# Patient Record
Sex: MALE | Race: OTHER | Employment: UNEMPLOYED | URBAN - METROPOLITAN AREA
[De-identification: names, ages, dates, MRNs, and addresses within clinical notes are randomized per-mention and may not be internally consistent; named-entity substitution may affect disease eponyms.]

---

## 2018-01-11 PROBLEM — E11.40 TYPE 2 DIABETES MELLITUS WITH DIABETIC NEUROPATHY (HCC): Status: ACTIVE | Noted: 2018-01-11

## 2019-09-25 ENCOUNTER — HOSPITAL ENCOUNTER (OUTPATIENT)
Dept: CT IMAGING | Age: 64
Discharge: HOME | End: 2019-09-25
Attending: INTERNAL MEDICINE
Payer: MEDICARE

## 2019-09-25 VITALS — HEIGHT: 67 IN | WEIGHT: 199 LBS | BODY MASS INDEX: 31.23 KG/M2

## 2019-09-25 DIAGNOSIS — Z86.73 HISTORY OF RECURRENT TIAS: ICD-10-CM

## 2019-09-25 LAB — CREAT BLD-MCNC: 1.3 MG/DL (ref 0.8–1.5)

## 2019-09-25 PROCEDURE — 74011000258 HC RX REV CODE- 258: Performed by: INTERNAL MEDICINE

## 2019-09-25 PROCEDURE — 82565 ASSAY OF CREATININE: CPT

## 2019-09-25 PROCEDURE — 74011636320 HC RX REV CODE- 636/320: Performed by: INTERNAL MEDICINE

## 2019-09-25 PROCEDURE — 70498 CT ANGIOGRAPHY NECK: CPT

## 2019-09-25 RX ORDER — SODIUM CHLORIDE 0.9 % (FLUSH) 0.9 %
10 SYRINGE (ML) INJECTION
Status: COMPLETED | OUTPATIENT
Start: 2019-09-25 | End: 2019-09-25

## 2019-09-25 RX ADMIN — Medication 10 ML: at 15:47

## 2019-09-25 RX ADMIN — IOPAMIDOL 60 ML: 755 INJECTION, SOLUTION INTRAVENOUS at 15:47

## 2019-09-25 RX ADMIN — SODIUM CHLORIDE 100 ML: 900 INJECTION, SOLUTION INTRAVENOUS at 15:47

## 2019-10-15 PROBLEM — I67.9 CEREBROVASCULAR DISEASE: Status: ACTIVE | Noted: 2019-10-15

## 2022-03-19 PROBLEM — E11.40 TYPE 2 DIABETES MELLITUS WITH DIABETIC NEUROPATHY (HCC): Status: ACTIVE | Noted: 2018-01-11

## 2022-03-19 PROBLEM — I67.9 CEREBROVASCULAR DISEASE: Status: ACTIVE | Noted: 2019-10-15

## 2022-07-21 ENCOUNTER — OFFICE VISIT (OUTPATIENT)
Dept: INTERNAL MEDICINE CLINIC | Facility: CLINIC | Age: 67
End: 2022-07-21
Payer: COMMERCIAL

## 2022-07-21 VITALS
HEART RATE: 62 BPM | SYSTOLIC BLOOD PRESSURE: 116 MMHG | OXYGEN SATURATION: 98 % | HEIGHT: 65 IN | DIASTOLIC BLOOD PRESSURE: 57 MMHG | BODY MASS INDEX: 31.65 KG/M2 | WEIGHT: 190 LBS | RESPIRATION RATE: 16 BRPM | TEMPERATURE: 98.4 F

## 2022-07-21 DIAGNOSIS — Z86.73 HISTORY OF CVA (CEREBROVASCULAR ACCIDENT): ICD-10-CM

## 2022-07-21 DIAGNOSIS — E11.65 UNCONTROLLED TYPE 2 DIABETES MELLITUS WITH HYPERGLYCEMIA (HCC): Primary | ICD-10-CM

## 2022-07-21 DIAGNOSIS — N18.32 STAGE 3B CHRONIC KIDNEY DISEASE (HCC): ICD-10-CM

## 2022-07-21 DIAGNOSIS — I25.10 CORONARY ARTERY DISEASE INVOLVING NATIVE HEART WITHOUT ANGINA PECTORIS, UNSPECIFIED VESSEL OR LESION TYPE: ICD-10-CM

## 2022-07-21 DIAGNOSIS — Z76.89 ENCOUNTER TO ESTABLISH CARE: ICD-10-CM

## 2022-07-21 DIAGNOSIS — I10 HYPERTENSION, UNSPECIFIED TYPE: ICD-10-CM

## 2022-07-21 DIAGNOSIS — I50.31 ACUTE HEART FAILURE WITH PRESERVED EJECTION FRACTION (HCC): ICD-10-CM

## 2022-07-21 DIAGNOSIS — E78.2 MIXED HYPERLIPIDEMIA: ICD-10-CM

## 2022-07-21 PROBLEM — D50.9 IRON DEFICIENCY ANEMIA: Status: ACTIVE | Noted: 2022-07-21

## 2022-07-21 PROCEDURE — 1123F ACP DISCUSS/DSCN MKR DOCD: CPT | Performed by: NURSE PRACTITIONER

## 2022-07-21 PROCEDURE — 99204 OFFICE O/P NEW MOD 45 MIN: CPT | Performed by: NURSE PRACTITIONER

## 2022-07-21 RX ORDER — INSULIN ASPART 100 [IU]/ML
4 INJECTION, SOLUTION INTRAVENOUS; SUBCUTANEOUS 2 TIMES DAILY
Qty: 9 ML | Refills: 5 | Status: SHIPPED | OUTPATIENT
Start: 2022-07-21 | End: 2022-08-18 | Stop reason: ALTCHOICE

## 2022-07-21 RX ORDER — LEVOTHYROXINE SODIUM 0.2 MG/1
TABLET ORAL
COMMUNITY
Start: 2022-06-21 | End: 2022-08-05 | Stop reason: SDUPTHER

## 2022-07-21 RX ORDER — ASPIRIN 81 MG/1
81 TABLET, CHEWABLE ORAL DAILY
COMMUNITY
Start: 2022-06-21

## 2022-07-21 RX ORDER — ATORVASTATIN CALCIUM 80 MG/1
80 TABLET, FILM COATED ORAL DAILY
COMMUNITY
Start: 2022-06-21 | End: 2022-07-21 | Stop reason: SDUPTHER

## 2022-07-21 RX ORDER — FUROSEMIDE 40 MG/1
40 TABLET ORAL DAILY
Qty: 90 TABLET | Refills: 1 | Status: CANCELLED | OUTPATIENT
Start: 2022-07-21 | End: 2022-08-20

## 2022-07-21 RX ORDER — GLIPIZIDE 10 MG/1
TABLET ORAL
COMMUNITY
Start: 2022-06-21 | End: 2022-07-21 | Stop reason: SDUPTHER

## 2022-07-21 RX ORDER — LEVOTHYROXINE SODIUM 0.2 MG/1
200 TABLET ORAL DAILY
Qty: 90 TABLET | Refills: 1 | Status: CANCELLED | OUTPATIENT
Start: 2022-07-21

## 2022-07-21 RX ORDER — LISINOPRIL 40 MG/1
40 TABLET ORAL DAILY
COMMUNITY
Start: 2022-06-21 | End: 2022-07-21 | Stop reason: SDUPTHER

## 2022-07-21 RX ORDER — LISINOPRIL 40 MG/1
40 TABLET ORAL DAILY
Qty: 90 TABLET | Refills: 0 | Status: SHIPPED | OUTPATIENT
Start: 2022-07-21 | End: 2022-10-20 | Stop reason: SDUPTHER

## 2022-07-21 RX ORDER — FERROUS SULFATE 325(65) MG
325 TABLET ORAL DAILY
Qty: 90 TABLET | Refills: 1 | Status: CANCELLED | OUTPATIENT
Start: 2022-07-21

## 2022-07-21 RX ORDER — LANCETS 23 GAUGE
EACH MISCELLANEOUS
COMMUNITY
Start: 2022-06-21

## 2022-07-21 RX ORDER — ATORVASTATIN CALCIUM 80 MG/1
80 TABLET, FILM COATED ORAL DAILY
Qty: 90 TABLET | Refills: 0 | Status: SHIPPED | OUTPATIENT
Start: 2022-07-21 | End: 2022-10-20 | Stop reason: SDUPTHER

## 2022-07-21 RX ORDER — FERROUS SULFATE 325(65) MG
325 TABLET ORAL DAILY
COMMUNITY
Start: 2022-06-21

## 2022-07-21 RX ORDER — LISINOPRIL 40 MG/1
40 TABLET ORAL DAILY
Qty: 90 TABLET | Refills: 1 | Status: CANCELLED | OUTPATIENT
Start: 2022-07-21

## 2022-07-21 RX ORDER — PEN NEEDLE, DIABETIC 30 GX3/16"
NEEDLE, DISPOSABLE MISCELLANEOUS
Qty: 100 EACH | Refills: 5 | Status: SHIPPED | OUTPATIENT
Start: 2022-07-21 | End: 2022-08-04 | Stop reason: SDUPTHER

## 2022-07-21 RX ORDER — PEN NEEDLE, DIABETIC 30 GX3/16"
NEEDLE, DISPOSABLE MISCELLANEOUS 2 TIMES DAILY
COMMUNITY
End: 2022-07-21 | Stop reason: SDUPTHER

## 2022-07-21 RX ORDER — GLIPIZIDE 10 MG/1
10 TABLET ORAL
Qty: 180 TABLET | Refills: 0 | Status: SHIPPED | OUTPATIENT
Start: 2022-07-21 | End: 2022-10-20 | Stop reason: ALTCHOICE

## 2022-07-21 RX ORDER — INSULIN ASPART 100 [IU]/ML
4 INJECTION, SOLUTION INTRAVENOUS; SUBCUTANEOUS 2 TIMES DAILY
COMMUNITY
End: 2022-07-21 | Stop reason: SDUPTHER

## 2022-07-21 RX ORDER — FUROSEMIDE 40 MG/1
40 TABLET ORAL DAILY
COMMUNITY
Start: 2022-06-22 | End: 2022-07-22

## 2022-07-21 RX ORDER — CARVEDILOL 25 MG/1
25 TABLET ORAL 2 TIMES DAILY
Qty: 180 TABLET | Refills: 1 | Status: CANCELLED | OUTPATIENT
Start: 2022-07-21

## 2022-07-21 RX ORDER — ATORVASTATIN CALCIUM 80 MG/1
80 TABLET, FILM COATED ORAL DAILY
Qty: 90 TABLET | Refills: 1 | Status: CANCELLED | OUTPATIENT
Start: 2022-07-21

## 2022-07-21 RX ORDER — CLOPIDOGREL BISULFATE 75 MG/1
TABLET ORAL
COMMUNITY
Start: 2022-06-21

## 2022-07-21 RX ORDER — CLOPIDOGREL BISULFATE 75 MG/1
75 TABLET ORAL DAILY
Qty: 90 TABLET | Refills: 1 | Status: CANCELLED | OUTPATIENT
Start: 2022-07-21

## 2022-07-21 RX ORDER — CARVEDILOL 25 MG/1
25 TABLET ORAL 2 TIMES DAILY
COMMUNITY
Start: 2022-06-21

## 2022-07-21 ASSESSMENT — ENCOUNTER SYMPTOMS
EYE PAIN: 0
RHINORRHEA: 0
SHORTNESS OF BREATH: 0
ABDOMINAL PAIN: 0
SINUS PAIN: 0
VOMITING: 0
NAUSEA: 0
DIARRHEA: 0
COUGH: 0
BACK PAIN: 0
CONSTIPATION: 0
SORE THROAT: 0

## 2022-07-21 ASSESSMENT — PATIENT HEALTH QUESTIONNAIRE - PHQ9
1. LITTLE INTEREST OR PLEASURE IN DOING THINGS: 0
SUM OF ALL RESPONSES TO PHQ QUESTIONS 1-9: 0
SUM OF ALL RESPONSES TO PHQ9 QUESTIONS 1 & 2: 0
SUM OF ALL RESPONSES TO PHQ QUESTIONS 1-9: 0
2. FEELING DOWN, DEPRESSED OR HOPELESS: 0

## 2022-07-21 NOTE — PROGRESS NOTES
[facility]  [address] [facility]  Tel# [phone]  Fax# [phone]       Necleveland SterlingReys, [facility]  Family Nurse Practitioner            Date of Visit: 2022     Elisha Craft (: 1955) is a 75 y.o. male  new patient, here for evaluation of the following chief complaint(s):    Chief Complaint   Patient presents with    Establish Care         Patient Care Team:  KULDEEP Alberts - CNP as PCP - General (Nurse Practitioner)  Shweta Newton MD as Surgeon         History of Present Illness      New Patient  Presents here as a new patient and wants to establish care. Previous PCP Dr. Marky Hsu MD of [address] [place] [address] Internal Medicine,  [address] with him was on 2022. DM Type II  Chronic condition; thinks he was diagnosed 6 years ago either here in [address] [place] [address]. or when he was at [facility]  Blood sugar monitoring: \"varies\" 200-459  Diet: B - coffee, bagel, cream cheese, banana           L/D: chicken or snack on a plum           States he only eats twice a day. Physical activity: \"I can't do much\", states he can't walk to the mailbox, states \"I have no energy\". Complications: CKD, neuropathy  Medications: Novolog insulin takes 4 units SQ twice a day; states he was given a sample. Hypertension  Chronic condition  BP monitoring: not monitoring      Fatigue  Pt states he feels tired \"all day\". Pt with complaint of not sleeping well for \"years\". States he felt he lost his energy after his last stroke.        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 Another daughter, has not spoken to her for over 20 years. Denies any spouse. States he likes to read, around 1-3:30p and at 1-2 am; states he has over 700 books (Abbey RuffaloCODY)  States he does not have an appetites, he feels his food is \"sufficient\", eating twice a day. States he used to weigh 265 lbs, 3 years ago, didn't like the way he looked, started using a treadmill for a year, lost 75 lbs. Has stopped using treadmill since the weight loss. Insomnia  States when he tries to sleep, he can't fall asleep, states when he takes a nap, he gets about 6 hours of sleep at night. States \"I won't take a pill to sleep, I don't believe in that\"          Lab Results   Component Value Date     12/05/2019    K 4.7 12/05/2019     12/05/2019    CO2 22 12/05/2019    BUN 30 (H) 12/05/2019    CREATININE 1.18 12/05/2019    GLUCOSE 99 12/05/2019    CALCIUM 9.7 12/05/2019    PROT 6.9 12/05/2019    LABALBU 3.9 12/05/2019    BILITOT 0.3 12/05/2019    ALKPHOS 157 (H) 12/05/2019    AST 6 12/05/2019    ALT 8 12/05/2019    LABGLOM 59 (L) 09/25/2019    GFRAA 74 12/05/2019    AGRATIO 1.3 12/05/2019          Lab Results   Component Value Date    CHOL 129 12/05/2019    CHOL 283 (H) 08/22/2019     Lab Results   Component Value Date    TRIG 117 12/05/2019    TRIG 236 (H) 08/22/2019     Lab Results   Component Value Date    HDL 31 (L) 12/05/2019    HDL 28 (L) 08/22/2019     Lab Results   Component Value Date    LDLCALC 75 12/05/2019    LDLCALC 208 (H) 08/22/2019     Lab Results   Component Value Date    LABVLDL 23 12/05/2019    LABVLDL 47 (H) 08/22/2019     No results found for: CHOLHDLRATIO     Lab Results   Component Value Date    TSH 4.740 (H) 12/05/2019        No results found for: PSA, PSADIA     No results found for: LABA1C  No results found for: EAG     [facility]  ?06/19/2022 - DIABETES ([facility])?   Component 06/19/2022 12/08/2020        Hemoglobin A1C 13.6 High     7.3 High Mean Bld Glu Estim. 344 --     Component      Hemoglobin A1C   Mean Bld Glu Estim. 06/11/2021 06/11/2021 04/13/2021 02/02/2021 02/01/2021 01/31/2021 01/30/2021 01/29/2021 01/28/2021 12/08/2020 12/08/2020                         Microalbumin Conc, Urine -- -- -- -- -- -- -- -- -- -- -- -- -- 150 Abnormal     --   Creatinine Conc, Urine -- -- -- -- -- -- -- -- -- -- -- -- -- 200 --   Microalb/Creat Ratio -- -- -- -- -- -- -- -- -- -- -- -- --  mg/g (abnormal) Abnormal     --   Creatinine 2.01 High     2.18 High     1.89 High     2.27 High     1.82 High     1.92 High     2.05 High     1.72 High     1.72 High     1.67 High     1.64 High     1.71 High     1.77 High     -- 1.27 High         Component      Microalbumin Conc, Urine   Creatinine Conc, Urine   Microalb/Creat Ratio   Creatinine     6/21/2022 - CHEM TESTS ([facility])?   Component 06/21/2022 06/21/2022 06/20/2022 06/20/2022 06/19/2022 06/19/2022 06/18/2022 06/11/2021 06/11/2021 04/13/2021 02/02/2021 02/01/2021 01/31/2021 01/31/2021 01/31/2021 01/30/2021 01/30/2021 01/29/2021 01/29/2021 01/28/2021 01/28/2021 01/28/2021 01/28/2021 01/28/2021 01/28/2021 12/08/2020                                Sodium 133 Low     -- 132 Low     -- 135 Low     -- 131 Low     135 Low     132 Low     133 Low     139 137 138 -- -- 138 -- 137 -- -- -- -- 140 -- -- 137   Potassium 3.5 -- 3.6 -- 3.6 -- 4.3 4.9 7.1 High Panic      4.9 3.8 3.5 3.8 -- -- 4.1 -- 4.1 -- -- -- -- 3.5 -- -- 4.2   Chloride 99 -- 96 Low     -- 101 -- 100 103 103 103 104 102 103 -- -- 105 -- 106 -- -- -- -- 105 -- -- 105   CO2 24 -- 25 -- 22 -- 19 Low     21 20 19 Low     23 23 24 -- -- 25 -- 23 -- -- -- -- 19 Low     -- -- 22   Anion Gap 10 -- 11 -- 12 -- 12 11 9 11 13 12 11 -- -- 8 -- 8 -- -- -- -- 16 -- -- 10   BUN 37 High     -- 31 High     -- 25 -- 30 High     30 High     31 High     34 High     41 High     38 High     31 High     -- -- 26 -- 26 -- -- -- -- 24 -- -- 26   Creatinine 2.01 High     -- 2.18 High     -- 1.89 High     -- 2.27 High     1.82 High     1.92 High     2.05 High     1.72 High     1.72 High     1.67 High     -- -- 1.64 High     -- 1.71 High     -- -- -- -- 1.77 High     -- -- 1.27 High       Glucose 237 High     -- 184 High     -- 86 -- 597 High Panic     328 High     367 High     398 High     162 High     212 High     90 -- -- 168 High     -- 170 High     -- -- -- -- 305 High     -- -- 159 High       Calcium 8.8 -- 8.9 -- 9.3 -- 8.8 9.7 9.6 9.6 8.8 8.8 9.2 -- -- 8.6 -- 8.5 -- -- -- -- 9.2 -- -- 9.7   AST -- -- -- -- -- 27 -- 15 41 High      15 -- -- -- -- -- 20 -- -- -- -- -- -- 18 -- -- 12   ALT -- -- -- -- -- 26 -- 25 26 22 -- -- -- -- -- 18 -- -- -- -- -- -- 24 -- -- 10   Alkaline Phosphatase -- -- -- -- -- 107 -- 151 High     145 149 -- -- -- -- -- 116 -- -- -- -- -- -- 165 High     -- -- 153 High       Protein, Total -- -- -- -- -- 6.8 -- 7.5 8.1 7.4 -- -- -- -- -- 5.9 Low     -- -- -- -- -- -- 7.9 -- -- 7.3   Albumin -- -- -- -- -- 3.0 Low     -- 3.2 Low     3.1 Low     3.2 Low     -- -- -- -- -- 2.9 Low     -- -- -- -- -- -- 3.4 -- -- 3.1 Low       Bilirubin, Total -- -- -- -- -- 0.3 -- 0.3 0.3 0.3 -- -- -- -- -- 0.4 -- -- -- -- -- -- 0.2 -- -- 0.3   eGFR 35 Low      -- 32 Low      -- 38 Low      -- 31 Low      38 Low     35 Low     33 Low     41 Low     41 Low     42 Low     -- -- 43 Low     -- 41 Low     -- -- -- -- 39 Low     -- 48 Low     58 Low       eGFR  -- -- -- -- -- -- -- 43 Low     41 Low     38 Low     47 Low     47 Low     49 Low     -- -- 50 Low     -- 47 Low     -- -- -- -- 45 Low     -- 55 Low     68   Calcium, Ionized -- -- -- -- -- -- -- -- -- -- -- -- -- -- -- -- -- -- -- -- -- -- -- -- 1.20 --   Magnesium -- 2.4 -- 2.2 -- -- -- -- -- -- -- -- -- 2.1 -- -- 1.9 -- 1.8 -- 2.1 -- -- -- -- --   Lactate -- -- -- -- -- -- -- -- -- -- -- -- -- -- -- -- -- -- -- 1.5 -- 5.2 High Panic     -- -- -- --   Procalcitonin -- -- -- -- -- -- -- -- -- -- -- -- -- -- 0.19 High     -- -- -- -- -- -- -- -- 0.05 -- --   Bilirubin, Direct -- -- -- -- -- 0.1 -- -- -- -- -- -- -- -- -- -- -- -- -- -- -- -- -- -- -- --   Bilirubin, Indirect -- -- -- -- -- 0.2 -- -- -- -- -- -- -- -- -- -- -- -- -- -- -- -- -- -- -- --     Component      Sodium   Potassium   Chloride   CO2   Anion Gap   BUN   Creatinine   Glucose   Calcium   AST   ALT   Alkaline Phosphatase   Protein, Total   Albumin   Bilirubin, Total   eGFR   eGFR African American   Calcium, Ionized   Magnesium             Patient Active Problem List   Diagnosis    Hypothyroidism    Peripheral neuropathy    Type 2 diabetes mellitus with diabetic neuropathy (HCC)    Cerebrovascular disease    Diabetes mellitus type II, uncontrolled (HCC)    Tobacco abuse    HTN (hypertension)    Stage 3b chronic kidney disease (HCC)    Mixed hyperlipidemia    Coronary artery disease involving native heart without angina pectoris    Acute heart failure with preserved ejection fraction (HCC)    Iron deficiency anemia       Past Medical History:   Diagnosis Date    Diabetes ([facility] [address]     Diabetes mellitus type II, uncontrolled ([facility] [address] 2/28/2014    GERD (gastroesophageal reflux disease)     HTN (hypertension) 2/28/2014    Hypothyroidism 2/28/2014    Peripheral neuropathy 2/28/2014    Psychiatric disorder     Thyroid disease     Tobacco abuse 2/28/2014       Past Surgical History:   Procedure Laterality Date    COLONOSCOPY  02/06/2012    due date 02/06/2015       Family History   Problem Relation Age of Onset    Alcohol Abuse Father          ALLERGY  Allergies   Allergen Reactions    Food Hives and Swelling     Onions and peppers           Current Outpatient Medications on File Prior to Visit   Medication Sig Dispense Refill    aspirin 81 MG chewable tablet Take 81 mg by mouth in the morning. furosemide (LASIX) 40 MG tablet Take 40 mg by mouth in the morning.       clopidogrel (PLAVIX) 75 MG tablet       ferrous sulfate (IRON 325) 325 (65 Fe) MG tablet Take 325 mg by mouth in the morning. levothyroxine (SYNTHROID) 200 MCG tablet       vitamin D (CHOLECALCIFEROL) 25 MCG (1000 UT) TABS tablet Take 1,000 Units by mouth in the morning. carvedilol (COREG) 25 MG tablet Take 25 mg by mouth in the morning and 25 mg before bedtime. Lancets 28G MISC Patient/Insurance Preference Use as directed 3 times a day. No current facility-administered medications on file prior to visit. Review of Systems  Review of Systems   Constitutional:  Positive for fatigue. Negative for chills and fever. HENT:  Negative for congestion, postnasal drip, rhinorrhea, sinus pain, sneezing and sore throat. Eyes:  Negative for pain and visual disturbance. Respiratory:  Negative for cough and shortness of breath. Cardiovascular:  Negative for chest pain, palpitations and leg swelling. Gastrointestinal:  Negative for abdominal pain, constipation, diarrhea, nausea and vomiting. Genitourinary:  Negative for dysuria, frequency and urgency. Musculoskeletal:  Negative for back pain, gait problem and joint swelling. Skin:  Negative for rash and wound. Neurological:  Positive for numbness (tingling to feet). Negative for dizziness and headaches. Psychiatric/Behavioral:  Positive for sleep disturbance. Negative for behavioral problems and suicidal ideas. The patient is not nervous/anxious. Vitals:    07/21/22 1018   BP: (!) 116/57   Site: Left Upper Arm   Position: Sitting   Cuff Size: Large Adult   Pulse: 62   Resp: 16   Temp: 98.4 °F (36.9 °C)   TempSrc: Temporal   SpO2: 98%   Weight: 190 lb (86.2 kg)   Height: 5' 4.96\" (1.65 m)              Physical Exam  Physical Exam  Constitutional:       General: He is not in acute distress. Appearance: He is not ill-appearing. HENT:      Head: Normocephalic and atraumatic. Eyes:      Pupils: Pupils are equal, round, and reactive to light.    Cardiovascular:      Rate and Rhythm: Normal rate and regular rhythm. Pulmonary:      Effort: Pulmonary effort is normal. No respiratory distress. Breath sounds: Normal breath sounds. Abdominal:      General: Bowel sounds are normal.      Palpations: Abdomen is soft. Musculoskeletal:         General: Normal range of motion. Cervical back: Neck supple. Right lower leg: Edema present. Left lower leg: Edema present. Skin:     General: Skin is warm and dry. Neurological:      General: No focal deficit present. Mental Status: He is alert and oriented to person, place, and time. Psychiatric:         Mood and Affect: Mood normal.         Thought Content: Thought content normal.              Assessment/Plan:          ICD-10-CM    1. Uncontrolled type 2 diabetes mellitus with hyperglycemia (HCC)  E11.65 Insulin Pen Needle (PEN NEEDLES) 32G X 4 MM MISC     insulin aspart (NOVOLOG) 100 UNIT/ML injection vial     [facility]     glipiZIDE (GLUCOTROL) 10 MG tablet      2. Encounter to establish care  Z76.89       3. Acute heart failure with preserved ejection fraction (HCC)  I50.31     Continue Carvedilol 25 mg oral twice a day. 4. Hypertension, unspecified type  I10 lisinopril (PRINIVIL;ZESTRIL) 40 MG tablet      5. Coronary artery disease involving native heart without angina pectoris, unspecified vessel or lesion type  I25.10     Continue Plavix. 6. Mixed hyperlipidemia  E78.2 atorvastatin (LIPITOR) 80 MG tablet      7. Stage 3b chronic kidney disease (HCC)  N18.32     Avoid NSAIDs such as ibuprofen. Ensure better blood sugar control. 8. History of CVA (cerebrovascular accident)  Z86.73     Cont Plavis 75 mg oral daily. Brittany Salguero was seen today for establish care.     Diagnoses and all orders for this visit:    Uncontrolled type 2 diabetes mellitus with hyperglycemia (HCC)  -     Insulin Pen Needle (PEN NEEDLES) 32G X 4 MM MISC; Use twice a day and as needed  -     insulin aspart (NOVOLOG) 100 UNIT/ML injection vial; Inject 4 Units into the skin in the morning and 4 Units before bedtime.  -     [facility] Di[facility] Treatment  -     glipiZIDE (GLUCOTROL) 10 MG tablet; Take 1 tablet by mouth in the morning and 1 tablet in the evening. Take before meals. Encounter to establish care    Acute heart failure with preserved ejection fraction (HCC)  Comments:  Continue Carvedilol 25 mg oral twice a day. Hypertension, unspecified type  -     lisinopril (PRINIVIL;ZESTRIL) 40 MG tablet; Take 1 tablet by mouth in the morning. Coronary artery disease involving native heart without angina pectoris, unspecified vessel or lesion type  Comments:  Continue Plavix. Mixed hyperlipidemia  -     atorvastatin (LIPITOR) 80 MG tablet; Take 1 tablet by mouth in the morning. Stage 3b chronic kidney disease ([address]  Comments:  Avoid NSAIDs such as ibuprofen. Ensure better blood sugar control. History of CVA (cerebrovascular accident)  Comments:  Cont Plavis 75 mg oral daily. Advise on low salt, low carbohydrate, low cholesterol, high fiber, nutrient dense, whole foods. Advise on portion control. Advise to avoid sweet foods, sweet drinks. Advise to read food labels. Advise on exercise or physical activity at least 30 minutes, 3-5 days a week, as tolerated. Advise on CV risks with diabetes mellitus and hyperlipidemia. Advised to monitor blood sugar, log blood sugar and log food intake and bring at next visit. Given medication list, reviewed med list from 6/24/2022 visit from Dr. Remington Dumont. Advised to  medication refills at the pharmacy.         Orders Placed This Encounter   Procedures    [facility]     Referral Priority:   Routine     Referral Type:   Eval and Treat     Referral Reason:   Specialty Services Required     Number of Visits Requested:   1                    Follow Up  Return in about 2 weeks (around 8/4/2022), or if symptoms worsen or fail to improve, for MARA (diabetes follow up).              Gerline Bank, DNP, FNP-BC

## 2022-07-22 ENCOUNTER — TELEPHONE (OUTPATIENT)
Dept: DIABETES SERVICES | Age: 67
End: 2022-07-22

## 2022-07-26 ENCOUNTER — FOLLOWUP TELEPHONE ENCOUNTER (OUTPATIENT)
Dept: DIABETES SERVICES | Age: 67
End: 2022-07-26

## 2022-07-26 NOTE — TELEPHONE ENCOUNTER
Called regarding diabetes education. Talked with daughter who stated yes check insurance and then gave us pt's mobile number.

## 2022-08-04 ENCOUNTER — OFFICE VISIT (OUTPATIENT)
Dept: INTERNAL MEDICINE CLINIC | Facility: CLINIC | Age: 67
End: 2022-08-04
Payer: COMMERCIAL

## 2022-08-04 VITALS
HEART RATE: 56 BPM | BODY MASS INDEX: 32.22 KG/M2 | HEIGHT: 65 IN | DIASTOLIC BLOOD PRESSURE: 60 MMHG | WEIGHT: 193.4 LBS | OXYGEN SATURATION: 99 % | SYSTOLIC BLOOD PRESSURE: 113 MMHG | TEMPERATURE: 98.4 F

## 2022-08-04 DIAGNOSIS — Z12.5 SCREENING FOR PROSTATE CANCER: ICD-10-CM

## 2022-08-04 DIAGNOSIS — E78.2 MIXED HYPERLIPIDEMIA: ICD-10-CM

## 2022-08-04 DIAGNOSIS — E11.65 UNCONTROLLED TYPE 2 DIABETES MELLITUS WITH HYPERGLYCEMIA (HCC): ICD-10-CM

## 2022-08-04 DIAGNOSIS — N18.32 STAGE 3B CHRONIC KIDNEY DISEASE (HCC): ICD-10-CM

## 2022-08-04 DIAGNOSIS — E11.65 UNCONTROLLED TYPE 2 DIABETES MELLITUS WITH HYPERGLYCEMIA (HCC): Primary | ICD-10-CM

## 2022-08-04 PROBLEM — D50.9 IRON DEFICIENCY ANEMIA: Status: RESOLVED | Noted: 2022-07-21 | Resolved: 2022-08-04

## 2022-08-04 LAB
ALBUMIN SERPL-MCNC: 3.2 G/DL (ref 3.2–4.6)
ALBUMIN/GLOB SERPL: 0.8 {RATIO} (ref 1.2–3.5)
ALP SERPL-CCNC: 175 U/L (ref 50–136)
ALT SERPL-CCNC: 101 U/L (ref 12–65)
ANION GAP SERPL CALC-SCNC: 7 MMOL/L (ref 7–16)
AST SERPL-CCNC: 40 U/L (ref 15–37)
BILIRUB SERPL-MCNC: 0.3 MG/DL (ref 0.2–1.1)
BUN SERPL-MCNC: 27 MG/DL (ref 8–23)
CALCIUM SERPL-MCNC: 9.1 MG/DL (ref 8.3–10.4)
CHLORIDE SERPL-SCNC: 105 MMOL/L (ref 98–107)
CHOLEST SERPL-MCNC: 179 MG/DL
CO2 SERPL-SCNC: 24 MMOL/L (ref 21–32)
CREAT SERPL-MCNC: 1.9 MG/DL (ref 0.8–1.5)
GLOBULIN SER CALC-MCNC: 3.9 G/DL (ref 2.3–3.5)
GLUCOSE SERPL-MCNC: 245 MG/DL (ref 65–100)
HDLC SERPL-MCNC: 31 MG/DL (ref 40–60)
HDLC SERPL: 5.8 {RATIO}
LDLC SERPL CALC-MCNC: 95.8 MG/DL
POTASSIUM SERPL-SCNC: 4.3 MMOL/L (ref 3.5–5.1)
PROT SERPL-MCNC: 7.1 G/DL (ref 6.3–8.2)
SODIUM SERPL-SCNC: 136 MMOL/L (ref 138–145)
TRIGL SERPL-MCNC: 261 MG/DL (ref 35–150)
TSH, 3RD GENERATION: 83.8 UIU/ML (ref 0.36–3.74)
VLDLC SERPL CALC-MCNC: 52.2 MG/DL (ref 6–23)

## 2022-08-04 PROCEDURE — 1123F ACP DISCUSS/DSCN MKR DOCD: CPT | Performed by: NURSE PRACTITIONER

## 2022-08-04 PROCEDURE — 99214 OFFICE O/P EST MOD 30 MIN: CPT | Performed by: NURSE PRACTITIONER

## 2022-08-04 RX ORDER — FERROUS SULFATE 325(65) MG
325 TABLET ORAL DAILY
Qty: 30 TABLET | Status: CANCELLED | OUTPATIENT
Start: 2022-08-04

## 2022-08-04 RX ORDER — INSULIN DEGLUDEC INJECTION 100 U/ML
20 INJECTION, SOLUTION SUBCUTANEOUS DAILY
Qty: 15 ML | Refills: 5 | Status: SHIPPED | OUTPATIENT
Start: 2022-08-04 | End: 2022-10-24 | Stop reason: SDUPTHER

## 2022-08-04 RX ORDER — PEN NEEDLE, DIABETIC 30 GX3/16"
NEEDLE, DISPOSABLE MISCELLANEOUS
Qty: 100 EACH | Refills: 5 | Status: SHIPPED | OUTPATIENT
Start: 2022-08-04

## 2022-08-04 ASSESSMENT — ANXIETY QUESTIONNAIRES
1. FEELING NERVOUS, ANXIOUS, OR ON EDGE: 0
5. BEING SO RESTLESS THAT IT IS HARD TO SIT STILL: 0
6. BECOMING EASILY ANNOYED OR IRRITABLE: 0
3. WORRYING TOO MUCH ABOUT DIFFERENT THINGS: 0
7. FEELING AFRAID AS IF SOMETHING AWFUL MIGHT HAPPEN: 0
4. TROUBLE RELAXING: 0
IF YOU CHECKED OFF ANY PROBLEMS ON THIS QUESTIONNAIRE, HOW DIFFICULT HAVE THESE PROBLEMS MADE IT FOR YOU TO DO YOUR WORK, TAKE CARE OF THINGS AT HOME, OR GET ALONG WITH OTHER PEOPLE: NOT DIFFICULT AT ALL
GAD7 TOTAL SCORE: 0
2. NOT BEING ABLE TO STOP OR CONTROL WORRYING: 0

## 2022-08-04 ASSESSMENT — PATIENT HEALTH QUESTIONNAIRE - PHQ9
1. LITTLE INTEREST OR PLEASURE IN DOING THINGS: 0
SUM OF ALL RESPONSES TO PHQ QUESTIONS 1-9: 0
SUM OF ALL RESPONSES TO PHQ9 QUESTIONS 1 & 2: 0
2. FEELING DOWN, DEPRESSED OR HOPELESS: 0

## 2022-08-04 ASSESSMENT — ENCOUNTER SYMPTOMS
SHORTNESS OF BREATH: 0
CONSTIPATION: 0
EYE PAIN: 0
ABDOMINAL PAIN: 0
NAUSEA: 0
COUGH: 0
BACK PAIN: 0
RHINORRHEA: 0
SORE THROAT: 0
DIARRHEA: 0
SINUS PAIN: 0
VOMITING: 0

## 2022-08-04 NOTE — PROGRESS NOTES
[facility]  [address]  Tel# [phone]  Fax# [phone]       Daksha Elena, [facility]-BC  Family Nurse Practitioner            Date of Visit: 2022     Gabrielle Gonsales (: 1955) is a 75 y.o. male  established patient, here for evaluation of the following chief complaint(s):    Chief Complaint   Patient presents with    Diabetes         Patient Care Team:  KULDEEP Gutierrez CNP as PCP - General (Nurse Practitioner)  KULDEEP Gutierrez CNP as PCP - [facility]  Kyel Lees MD as Surgeon         History of Present Illness      Diabetes Type II  Chronic condition  Presents here for 2 week follow up on diabetes.   Blood sugar: still fluctuates up and down  Brought blood sugar lop, 260s-290s                                             4p, 476, 534, 394, 397, 363    Diet: only eats twice a day         12 pm as breakfast: raisin bagel, original cream cheese with coffee with milk 2% with 2 tablespoon of regular sugar          Next meal at 7pm: chicken (fried in Olive oil), mac n cheese or with ground beef; potatoes (broiled) with ribs (honey BBQ sauce)          Naps 5:30p-7p    Meds: still taking Glipizide oral twice a day, insulin 4 units twice a day    No results found for: LABA1C  No results found for: EAG     Lab Results   Component Value Date    CHOL 129 2019    CHOL 283 (H) 2019     Lab Results   Component Value Date    TRIG 117 2019    TRIG 236 (H) 2019     Lab Results   Component Value Date    HDL 31 (L) 2019    HDL 28 (L) 2019     Lab Results   Component Value Date    LDLCALC 75 2019    LDLCALC 208 (H) 2019     Lab Results   Component Value Date    LABVLDL 23 2019    LABVLDL 47 (H) 2019     No results found for: [facility]           Patient Active Problem List   Diagnosis    Hypothyroidism    Peripheral neuropathy    Type 2 diabetes mellitus with diabetic neuropathy ([facility] Cerebrovascular disease    Diabetes mellitus type II, uncontrolled ([facility]    Tobacco abuse    HTN (hypertension)    Stage 3b chronic kidney disease ([facility]    Mixed hyperlipidemia    Coronary artery disease involving native heart without angina pectoris    Acute heart failure with preserved ejection fraction [facility])       Past Medical History:   Diagnosis Date    Diabetes ([facility]     Diabetes mellitus type II, uncontrolled ([facility] 2/28/2014    GERD (gastroesophageal reflux disease)     HTN (hypertension) 2/28/2014    Hypothyroidism 2/28/2014    Peripheral neuropathy 2/28/2014    Psychiatric disorder     Thyroid disease     Tobacco abuse 2/28/2014       Past Surgical History:   Procedure Laterality Date    COLONOSCOPY  02/06/2012    due date 02/06/2015       Family History   Problem Relation Age of Onset    Alcohol Abuse Father          ALLERGY  Allergies   Allergen Reactions    Food Hives and Swelling     Onions and peppers    Other Hives     Peppers and onions           Current Outpatient Medications on File Prior to Visit   Medication Sig Dispense Refill    aspirin 81 MG chewable tablet Take 81 mg by mouth in the morning. clopidogrel (PLAVIX) 75 MG tablet       ferrous sulfate (IRON 325) 325 (65 Fe) MG tablet Take 325 mg by mouth in the morning. levothyroxine (SYNTHROID) 200 MCG tablet       vitamin D (CHOLECALCIFEROL) 25 MCG (1000 UT) TABS tablet Take 1,000 Units by mouth in the morning. carvedilol (COREG) 25 MG tablet Take 25 mg by mouth in the morning and 25 mg before bedtime. Lancets 28G MISC Patient/Insurance Preference Use as directed 3 times a day. insulin aspart (NOVOLOG) 100 UNIT/ML injection vial Inject 4 Units into the skin in the morning and 4 Units before bedtime. 9 mL 5    lisinopril (PRINIVIL;ZESTRIL) 40 MG tablet Take 1 tablet by mouth in the morning. 90 tablet 0    atorvastatin (LIPITOR) 80 MG tablet Take 1 tablet by mouth in the morning.  90 tablet 0    glipiZIDE (GLUCOTROL) 10 MG tablet Take 1 tablet by mouth in the morning and 1 tablet in the evening. Take before meals. 180 tablet 0    furosemide (LASIX) 40 MG tablet Take 40 mg by mouth in the morning. No current facility-administered medications on file prior to visit. Review of Systems  Review of Systems   Constitutional:  Negative for chills, fatigue and fever. HENT:  Negative for congestion, postnasal drip, rhinorrhea, sinus pain, sneezing and sore throat. Eyes:  Negative for pain and visual disturbance. Respiratory:  Negative for cough and shortness of breath. Cardiovascular:  Negative for chest pain, palpitations and leg swelling. Gastrointestinal:  Negative for abdominal pain, constipation, diarrhea, nausea and vomiting. Endocrine:        Hyperglycemia   Genitourinary:  Negative for dysuria, frequency and urgency. Musculoskeletal:  Negative for back pain, gait problem and joint swelling. Skin:  Negative for rash and wound. Neurological:  Negative for dizziness and headaches. Psychiatric/Behavioral:  Negative for behavioral problems, sleep disturbance and suicidal ideas. The patient is not nervous/anxious. Vitals:    08/04/22 1408   BP: 113/60   Site: Right Upper Arm   Position: Sitting   Cuff Size: Large Adult   Pulse: 56   Temp: 98.4 °F (36.9 °C)   TempSrc: Temporal   SpO2: 99%   Weight: 193 lb 6.4 oz (87.7 kg)   Height: 5' 4.96\" (1.65 m)              Physical Exam  Physical Exam  Constitutional:       General: He is not in acute distress. Appearance: He is obese. He is not ill-appearing. HENT:      Head: Normocephalic and atraumatic. Eyes:      Pupils: Pupils are equal, round, and reactive to light. Cardiovascular:      Rate and Rhythm: Normal rate and regular rhythm. Pulmonary:      Effort: Pulmonary effort is normal. No respiratory distress. Breath sounds: Normal breath sounds.    Abdominal:      General: Bowel sounds are normal.      Palpations: Abdomen is soft.   Musculoskeletal:         General: Normal range of motion. Cervical back: Neck supple. Skin:     General: Skin is warm and dry. Neurological:      General: No focal deficit present. Mental Status: He is alert and oriented to person, place, and time. Psychiatric:         Mood and Affect: Mood normal.         Thought Content: Thought content normal.              Assessment/Plan:          ICD-10-CM    1. Uncontrolled type 2 diabetes mellitus with hyperglycemia (HCC)  E11.65 Insulin Degludec (TRESIBA FLEXTOUCH) 100 UNIT/ML SOPN     Comprehensive Metabolic Panel     TSH     Insulin Pen Needle (PEN NEEDLES) 32G X 4 MM MISC    Cont Glizipide oral twice a day, Novolog insulin with meals      2. Mixed hyperlipidemia  E78.2 Lipid Panel    Cont Atorvastatin 80 mg oral daily. 3. Stage 3b chronic kidney disease (HCC)  N18.32 Comprehensive Metabolic Panel    Avoid NSAIDs such as ibuprofen. Ensure adequate fluids with plain water. 4. Screening for prostate cancer  Z12.5 PSA, Total and Free               Kim was seen today for diabetes. Diagnoses and all orders for this visit:    Uncontrolled type 2 diabetes mellitus with hyperglycemia ([facility]  Comments:  Cont Glizipide oral twice a day, Novolog insulin with meals  Orders:  -     Insulin Degludec (TRESIBA FLEXTOUCH) 100 UNIT/ML SOPN; Inject 20 Units into the skin daily  -     Comprehensive Metabolic Panel; Future  -     TSH; Future  -     Insulin Pen Needle (PEN NEEDLES) 32G X 4 MM MISC; Use twice a day and as needed    Mixed hyperlipidemia  Comments:  Cont Atorvastatin 80 mg oral daily. Orders:  -     Lipid Panel; Future    Stage 3b chronic kidney disease ([facility] [address] [facility]  Comments:  Avoid NSAIDs such as ibuprofen. Ensure adequate fluids with plain water. Orders:  -     Comprehensive Metabolic Panel;  Future    Screening for prostate cancer  -     PSA, Total and Free; Future       Advise on low carbohydrate, low cholesterol, high fiber, nutrient dense, whole foods. Advise on portion control. Advise to avoid sweet foods, sweet drinks. Advise to read food labels. Advise on exercise or physical activity at least 30 minutes, 3-5 days a week, as tolerated. Advise on CV risks with diabetes mellitus and hyperlipidemia. Cont to monitor blood sugar and bring log at next visit. Advised to monitor and report any medication intolerance or side effects. Advised to incorporate fiber and protein with his breakfast. Advised substituting regular sugar to Stevia or monk fruit sweetener. Advised on portion control. Discussed blood sugar spike, s/s of hyperglycemia/hypoglycemia. Continue other meds as prescribed. Orders Placed This Encounter   Procedures    Lipid Panel     Standing Status:   Future     Number of Occurrences:   1     Standing Expiration Date:   8/4/2023     Order Specific Question:   Is Patient Fasting?/# of Hours     Answer:   Yes    Comprehensive Metabolic Panel     Standing Status:   Future     Number of Occurrences:   1     Standing Expiration Date:   8/4/2023    TSH     Standing Status:   Future     Number of Occurrences:   1     Standing Expiration Date:   8/4/2023    PSA, Total and Free     Standing Status:   Future     Number of Occurrences:   1     Standing Expiration Date:   8/4/2023                    Follow Up  Return in about 2 weeks (around 8/18/2022), or if symptoms worsen or fail to improve, for ROV (diabetes follow up).              Wing Rivera, DNP, FNP-BC

## 2022-08-05 DIAGNOSIS — E03.9 HYPOTHYROIDISM, UNSPECIFIED TYPE: ICD-10-CM

## 2022-08-05 DIAGNOSIS — R79.89 ABNORMAL LFTS: ICD-10-CM

## 2022-08-05 DIAGNOSIS — R79.89 ABNORMAL THYROID STIMULATING HORMONE LEVEL: Primary | ICD-10-CM

## 2022-08-05 LAB
PSA FREE MFR SERPL: 33.3 %
PSA FREE SERPL-MCNC: 0.2 NG/ML
PSA SERPL-MCNC: 0.6 NG/ML

## 2022-08-05 RX ORDER — LEVOTHYROXINE SODIUM 0.2 MG/1
200 TABLET ORAL DAILY
Qty: 90 TABLET | Refills: 0 | Status: SHIPPED | OUTPATIENT
Start: 2022-08-05 | End: 2022-10-24 | Stop reason: SDUPTHER

## 2022-08-06 LAB
T3 SERPL-MCNC: 0.28 NG/ML (ref 0.6–1.81)
T4 FREE SERPL-MCNC: 0.4 NG/DL (ref 0.78–1.46)

## 2022-08-08 LAB — RPR SER QL: NONREACTIVE

## 2022-08-10 ENCOUNTER — TELEPHONE (OUTPATIENT)
Dept: INTERNAL MEDICINE CLINIC | Facility: CLINIC | Age: 67
End: 2022-08-10

## 2022-08-10 NOTE — TELEPHONE ENCOUNTER
Patient called stating that he was returning a call from you. I did not see any notes as to what the call was about. Please contact patient.

## 2022-08-11 ENCOUNTER — TELEPHONE (OUTPATIENT)
Dept: INTERNAL MEDICINE CLINIC | Facility: CLINIC | Age: 67
End: 2022-08-11

## 2022-08-11 NOTE — TELEPHONE ENCOUNTER
----- Message from KULDEEP Herrera CNP sent at 8/8/2022  4:26 PM EDT -----  T4 low, T3 low, TSH very elevated. Please inquire compliance with thyroid medication. See other result note.

## 2022-08-18 ENCOUNTER — OFFICE VISIT (OUTPATIENT)
Dept: INTERNAL MEDICINE CLINIC | Facility: CLINIC | Age: 67
End: 2022-08-18
Payer: COMMERCIAL

## 2022-08-18 VITALS
RESPIRATION RATE: 15 BRPM | HEART RATE: 60 BPM | BODY MASS INDEX: 31.43 KG/M2 | WEIGHT: 195.6 LBS | HEIGHT: 66 IN | SYSTOLIC BLOOD PRESSURE: 111 MMHG | TEMPERATURE: 97.4 F | DIASTOLIC BLOOD PRESSURE: 51 MMHG | OXYGEN SATURATION: 99 %

## 2022-08-18 DIAGNOSIS — E11.40 TYPE 2 DIABETES MELLITUS WITH DIABETIC NEUROPATHY, WITH LONG-TERM CURRENT USE OF INSULIN (HCC): Primary | ICD-10-CM

## 2022-08-18 DIAGNOSIS — Z79.4 TYPE 2 DIABETES MELLITUS WITH DIABETIC NEUROPATHY, WITH LONG-TERM CURRENT USE OF INSULIN (HCC): Primary | ICD-10-CM

## 2022-08-18 DIAGNOSIS — E03.9 HYPOTHYROIDISM, UNSPECIFIED TYPE: ICD-10-CM

## 2022-08-18 PROCEDURE — 1123F ACP DISCUSS/DSCN MKR DOCD: CPT | Performed by: NURSE PRACTITIONER

## 2022-08-18 PROCEDURE — 99214 OFFICE O/P EST MOD 30 MIN: CPT | Performed by: NURSE PRACTITIONER

## 2022-08-18 RX ORDER — GLUCOSAMINE HCL/CHONDROITIN SU 500-400 MG
CAPSULE ORAL
Qty: 100 STRIP | Refills: 5 | Status: SHIPPED | OUTPATIENT
Start: 2022-08-18 | End: 2022-08-19 | Stop reason: SDUPTHER

## 2022-08-18 RX ORDER — INSULIN ASPART 100 [IU]/ML
INJECTION, SOLUTION INTRAVENOUS; SUBCUTANEOUS
COMMUNITY
Start: 2022-07-21 | End: 2022-08-18 | Stop reason: SDUPTHER

## 2022-08-18 RX ORDER — INSULIN ASPART 100 [IU]/ML
4 INJECTION, SOLUTION INTRAVENOUS; SUBCUTANEOUS
Qty: 15 ML | Refills: 5 | Status: SHIPPED | OUTPATIENT
Start: 2022-08-18 | End: 2022-10-24 | Stop reason: SDUPTHER

## 2022-08-18 ASSESSMENT — ANXIETY QUESTIONNAIRES
6. BECOMING EASILY ANNOYED OR IRRITABLE: 0
GAD7 TOTAL SCORE: 0
1. FEELING NERVOUS, ANXIOUS, OR ON EDGE: 0
IF YOU CHECKED OFF ANY PROBLEMS ON THIS QUESTIONNAIRE, HOW DIFFICULT HAVE THESE PROBLEMS MADE IT FOR YOU TO DO YOUR WORK, TAKE CARE OF THINGS AT HOME, OR GET ALONG WITH OTHER PEOPLE: NOT DIFFICULT AT ALL
7. FEELING AFRAID AS IF SOMETHING AWFUL MIGHT HAPPEN: 0
6. BECOMING EASILY ANNOYED OR IRRITABLE: 0
2. NOT BEING ABLE TO STOP OR CONTROL WORRYING: 0
GAD7 TOTAL SCORE: 0
4. TROUBLE RELAXING: 0
7. FEELING AFRAID AS IF SOMETHING AWFUL MIGHT HAPPEN: 0
3. WORRYING TOO MUCH ABOUT DIFFERENT THINGS: 0
5. BEING SO RESTLESS THAT IT IS HARD TO SIT STILL: 0
1. FEELING NERVOUS, ANXIOUS, OR ON EDGE: 0
5. BEING SO RESTLESS THAT IT IS HARD TO SIT STILL: 0
IF YOU CHECKED OFF ANY PROBLEMS ON THIS QUESTIONNAIRE, HOW DIFFICULT HAVE THESE PROBLEMS MADE IT FOR YOU TO DO YOUR WORK, TAKE CARE OF THINGS AT HOME, OR GET ALONG WITH OTHER PEOPLE: NOT DIFFICULT AT ALL
2. NOT BEING ABLE TO STOP OR CONTROL WORRYING: 0
4. TROUBLE RELAXING: 0
3. WORRYING TOO MUCH ABOUT DIFFERENT THINGS: 0

## 2022-08-18 ASSESSMENT — PATIENT HEALTH QUESTIONNAIRE - PHQ9
SUM OF ALL RESPONSES TO PHQ QUESTIONS 1-9: 0
SUM OF ALL RESPONSES TO PHQ9 QUESTIONS 1 & 2: 0
SUM OF ALL RESPONSES TO PHQ9 QUESTIONS 1 & 2: 0
SUM OF ALL RESPONSES TO PHQ QUESTIONS 1-9: 0
1. LITTLE INTEREST OR PLEASURE IN DOING THINGS: 0
SUM OF ALL RESPONSES TO PHQ QUESTIONS 1-9: 0
1. LITTLE INTEREST OR PLEASURE IN DOING THINGS: 0
2. FEELING DOWN, DEPRESSED OR HOPELESS: 0
2. FEELING DOWN, DEPRESSED OR HOPELESS: 0

## 2022-08-18 ASSESSMENT — ENCOUNTER SYMPTOMS
VOMITING: 0
COUGH: 0
DIARRHEA: 0
SHORTNESS OF BREATH: 0
EYE PAIN: 0
NAUSEA: 0
SINUS PAIN: 0
BACK PAIN: 0
CONSTIPATION: 0
RHINORRHEA: 0
ABDOMINAL PAIN: 0
SORE THROAT: 0

## 2022-08-18 NOTE — PROGRESS NOTES
[facility]  [place] [address]  Tel# [phone]  Fax# [phone]       [facility]  Family Nurse Practitioner            Date of Visit: 2022     Kitty Rodriguez (: 1955) is a 75 y.o. male  established patient, here for evaluation of the following chief complaint(s):    Chief Complaint   Patient presents with    Diabetes     Patient not taking all diabetic medications, thought he had to finish Novolog before taking [place] Labrum, didn't realize he was to take both of them. Medication Refill     Relion glucose test strips           Patient Care Team:  KULDEEP Herrera CNP as PCP - General (Nurse Practitioner)  KULDEEP Herrera CNP as PCP - [facility] Empaneled Provider  Shaan Lombardi MD as Surgeon         History of Present Illness      Diabetes Follow Up  Presents here for diabetes follow up. States his blood sugar has been staying below 300. Still likes his coffee [employer] with 2% with 3 table spoons of sugar, twice a day (morning and at night), usually gets about 6 hours of sleep. Diet: eats chicken, bananas (usually 5-6 pcs), avocado, avoiding rice, tomatoes with oil and vinegar  Has not started taking his Tresiba insulin. Hypothyroidism  States he has been taking his Levothyroxine with the rest of his medications. Pt denies any changes or any new symptoms. Transportation: arranges a ride for his appt. No results found for: LABA1C  No results found for: EAG      Contains abnormal data Hgb A1C (Glycohemoglobin)  Order: [id number]   Ref Range & Units 22 0718   Hemoglobin A1C <5.7 % 13.6 High     Mean Bld Glu Estim.  Refer to Glucose mg/dL 344      Lab Results   Component Value Date    TSH 4.740 (H) 2019    SIE3NFC 83.800 (H) 2022        Lab Results   Component Value Date     (L) 2022    K 4.3 2022     2022    CO2 24 2022    BUN 27 (H) 2022    CREATININE 1.90 (H) 2022 GLUCOSE 245 (H) 08/04/2022    CALCIUM 9.1 08/04/2022    PROT 7.1 08/04/2022    LABALBU 3.2 08/04/2022    BILITOT 0.3 08/04/2022    ALKPHOS 175 (H) 08/04/2022    AST 40 (H) 08/04/2022     (H) 08/04/2022    LABGLOM 38 (L) 08/04/2022    GFRAA 46 (L) 08/04/2022    AGRATIO 1.3 12/05/2019    GLOB 3.9 (H) 08/04/2022        Lab Results   Component Value Date    CHOL 179 08/04/2022    CHOL 129 12/05/2019    CHOL 283 (H) 08/22/2019     Lab Results   Component Value Date    TRIG 261 (H) 08/04/2022    TRIG 117 12/05/2019    TRIG 236 (H) 08/22/2019     Lab Results   Component Value Date    HDL 31 (L) 08/04/2022    HDL 31 (L) 12/05/2019    HDL 28 (L) 08/22/2019     Lab Results   Component Value Date    LDLCALC 95.8 08/04/2022    LDLCALC 75 12/05/2019    LDLCALC 208 (H) 08/22/2019     Lab Results   Component Value Date    LABVLDL 52.2 (H) 08/04/2022    LABVLDL 23 12/05/2019    LABVLDL 47 (H) 08/22/2019     Lab Results   Component Value Date    CHOLHDLRATIO 5.8 08/04/2022              Patient Active Problem List   Diagnosis    Hypothyroidism    Peripheral neuropathy    Type 2 diabetes mellitus with diabetic neuropathy (HCC)    Cerebrovascular disease    Diabetes mellitus type II, uncontrolled (HCC)    Tobacco abuse    HTN (hypertension)    Stage 3b chronic kidney disease ([address]    Mixed hyperlipidemia    Coronary artery disease involving native heart without angina pectoris    Acute heart failure with preserved ejection fraction [facility])       Past Medical History:   Diagnosis Date    Diabetes ([address]     Diabetes mellitus type II, uncontrolled ([facility] [facility] 2/28/2014    GERD (gastroesophageal reflux disease)     HTN (hypertension) 2/28/2014    Hypothyroidism 2/28/2014    Peripheral neuropathy 2/28/2014    Psychiatric disorder     Thyroid disease     Tobacco abuse 2/28/2014       Past Surgical History:   Procedure Laterality Date    COLONOSCOPY  02/06/2012    due date 02/06/2015       Family History   Problem Relation Age of Onset Alcohol Abuse Father          ALLERGY  Allergies   Allergen Reactions    Food Hives and Swelling     Onions and peppers    Other Hives     Peppers and onions           Current Outpatient Medications on File Prior to Visit   Medication Sig Dispense Refill    levothyroxine (SYNTHROID) 200 MCG tablet Take 1 tablet by mouth in the morning. On empty stomach. 90 tablet 0    Insulin Pen Needle (PEN NEEDLES) 32G X 4 MM MISC Use twice a day and as needed 100 each 5    aspirin 81 MG chewable tablet Take 81 mg by mouth in the morning. clopidogrel (PLAVIX) 75 MG tablet       ferrous sulfate (IRON 325) 325 (65 Fe) MG tablet Take 325 mg by mouth in the morning. vitamin D (CHOLECALCIFEROL) 25 MCG (1000 UT) TABS tablet Take 1,000 Units by mouth in the morning. carvedilol (COREG) 25 MG tablet Take 25 mg by mouth in the morning and 25 mg before bedtime. Lancets 28G MISC Patient/Insurance Preference Use as directed 3 times a day. lisinopril (PRINIVIL;ZESTRIL) 40 MG tablet Take 1 tablet by mouth in the morning. 90 tablet 0    atorvastatin (LIPITOR) 80 MG tablet Take 1 tablet by mouth in the morning. 90 tablet 0    glipiZIDE (GLUCOTROL) 10 MG tablet Take 1 tablet by mouth in the morning and 1 tablet in the evening. Take before meals. 180 tablet 0    Insulin Degludec (TRESIBA FLEXTOUCH) 100 UNIT/ML SOPN Inject 20 Units into the skin daily (Patient not taking: Reported on 8/18/2022) 15 mL 5    furosemide (LASIX) 40 MG tablet Take 40 mg by mouth in the morning. No current facility-administered medications on file prior to visit. Review of Systems  Review of Systems   Constitutional:  Negative for chills, fatigue and fever. HENT:  Negative for congestion, postnasal drip, rhinorrhea, sinus pain, sneezing and sore throat. Eyes:  Negative for pain and visual disturbance. Respiratory:  Negative for cough and shortness of breath.     Cardiovascular:  Negative for chest pain, palpitations and leg swelling. Gastrointestinal:  Negative for abdominal pain, constipation, diarrhea, nausea and vomiting. Genitourinary:  Negative for dysuria, frequency and urgency. Musculoskeletal:  Negative for back pain, gait problem and joint swelling. Skin:  Negative for rash and wound. Neurological:  Negative for dizziness and headaches. Psychiatric/Behavioral:  Negative for behavioral problems, sleep disturbance and suicidal ideas. The patient is not nervous/anxious. Vitals:    08/18/22 1444   BP: (!) 111/51   Site: Left Upper Arm   Position: Sitting   Cuff Size: Large Adult   Pulse: 60   Resp: 15   Temp: 97.4 °F (36.3 °C)   TempSrc: Temporal   SpO2: 99%   Weight: 195 lb 9.6 oz (88.7 kg)   Height: 5' 5.55\" (1.665 m)              Physical Exam  Physical Exam  Constitutional:       General: He is not in acute distress. Appearance: He is obese. He is not ill-appearing. HENT:      Head: Normocephalic and atraumatic. Eyes:      Pupils: Pupils are equal, round, and reactive to light. Cardiovascular:      Rate and Rhythm: Normal rate and regular rhythm. Pulmonary:      Effort: Pulmonary effort is normal. No respiratory distress. Breath sounds: Normal breath sounds. Abdominal:      General: Bowel sounds are normal.      Palpations: Abdomen is soft. Musculoskeletal:         General: Normal range of motion. Cervical back: Neck supple. Skin:     General: Skin is warm and dry. Neurological:      General: No focal deficit present. Mental Status: He is alert and oriented to person, place, and time. Psychiatric:         Mood and Affect: Mood normal.         Thought Content: Thought content normal.              Assessment/Plan:          ICD-10-CM    1.  Type 2 diabetes mellitus with diabetic neuropathy, with long-term current use of insulin ([facility])  E11.40 NOVOLOG FLEXPEN RELION 100 UNIT/ML injection pen    Z79.4 blood glucose monitor strips    Cont Glipizide 10 mg oral twice, start Tresiba 20 units SQ once a day (prescribed last visit), increased Novolog 4 units to three times a day before meals. 2. Hypothyroidism, unspecified type  E03.9     Ensure to take Levothyroxine on empty stomach (don't take with other medications). Kim was seen today for diabetes and medication refill. Diagnoses and all orders for this visit:    Type 2 diabetes mellitus with diabetic neuropathy, with long-term current use of insulin ([facility])  Comments:  Cont Glipizide 10 mg oral twice, start Tresiba 20 units SQ once a day (prescribed last visit), increased Novolog 4 units to three times a day before meals. Orders:  -     NOVOLOG FLEXPEN RELION 100 UNIT/ML injection pen; Inject 4 Units into the skin 3 times daily (before meals)  -     blood glucose monitor strips; Test 4x times a day & as needed for symptoms of irregular blood glucose. Dispense sufficient amount for indicated testing frequency plus additional to accommodate PRN testing needs. Reli On    Hypothyroidism, unspecified type  Comments:  Ensure to take Levothyroxine on empty stomach (don't take with other medications). Advise on low carbohydrate, low cholesterol, high fiber, nutrient dense, whole foods. Advise on portion control. Advise to avoid sweet foods, sweet drinks. Advise to read food labels. Advise on exercise or physical activity at least 30 minutes, 3-5 days a week, as tolerated. Advise on CV risks with diabetes mellitus and hyperlipidemia. Continue other meds as prescribed. Follow Up  Return in about 1 month (around 9/20/2022), or if symptoms worsen or fail to improve, for ROV with labs.              Castro Gilbert, DNP, FNP-BC

## 2022-08-19 DIAGNOSIS — Z79.4 TYPE 2 DIABETES MELLITUS WITH DIABETIC NEUROPATHY, WITH LONG-TERM CURRENT USE OF INSULIN (HCC): ICD-10-CM

## 2022-08-19 DIAGNOSIS — E11.40 TYPE 2 DIABETES MELLITUS WITH DIABETIC NEUROPATHY, WITH LONG-TERM CURRENT USE OF INSULIN (HCC): ICD-10-CM

## 2022-08-19 RX ORDER — GLUCOSAMINE HCL/CHONDROITIN SU 500-400 MG
CAPSULE ORAL
Qty: 100 STRIP | Refills: 5 | Status: SHIPPED | OUTPATIENT
Start: 2022-08-19

## 2022-10-04 ENCOUNTER — TELEPHONE (OUTPATIENT)
Dept: INTERNAL MEDICINE CLINIC | Facility: CLINIC | Age: 67
End: 2022-10-04

## 2022-10-04 NOTE — TELEPHONE ENCOUNTER
Spoke to patient, didn't remember that he had an appointment yesterday, \" he's been a little out of it\". Appointment rescheduled for Oct 20th .

## 2022-10-20 ENCOUNTER — OFFICE VISIT (OUTPATIENT)
Dept: INTERNAL MEDICINE CLINIC | Facility: CLINIC | Age: 67
End: 2022-10-20
Payer: COMMERCIAL

## 2022-10-20 VITALS
DIASTOLIC BLOOD PRESSURE: 65 MMHG | TEMPERATURE: 98 F | HEIGHT: 65 IN | BODY MASS INDEX: 31.42 KG/M2 | WEIGHT: 188.6 LBS | OXYGEN SATURATION: 100 % | SYSTOLIC BLOOD PRESSURE: 102 MMHG | RESPIRATION RATE: 15 BRPM | HEART RATE: 80 BPM

## 2022-10-20 DIAGNOSIS — E11.40 TYPE 2 DIABETES MELLITUS WITH DIABETIC NEUROPATHY, WITH LONG-TERM CURRENT USE OF INSULIN (HCC): Primary | ICD-10-CM

## 2022-10-20 DIAGNOSIS — I25.10 CORONARY ARTERY DISEASE INVOLVING NATIVE CORONARY ARTERY OF NATIVE HEART WITHOUT ANGINA PECTORIS: ICD-10-CM

## 2022-10-20 DIAGNOSIS — I10 ESSENTIAL HYPERTENSION: ICD-10-CM

## 2022-10-20 DIAGNOSIS — E11.40 TYPE 2 DIABETES MELLITUS WITH DIABETIC NEUROPATHY, WITH LONG-TERM CURRENT USE OF INSULIN (HCC): ICD-10-CM

## 2022-10-20 DIAGNOSIS — N18.32 STAGE 3B CHRONIC KIDNEY DISEASE (HCC): ICD-10-CM

## 2022-10-20 DIAGNOSIS — F17.210 CIGARETTE SMOKER: ICD-10-CM

## 2022-10-20 DIAGNOSIS — E78.00 PURE HYPERCHOLESTEROLEMIA: ICD-10-CM

## 2022-10-20 DIAGNOSIS — E03.9 HYPOTHYROIDISM, UNSPECIFIED TYPE: ICD-10-CM

## 2022-10-20 DIAGNOSIS — D64.9 ANEMIA, UNSPECIFIED TYPE: ICD-10-CM

## 2022-10-20 DIAGNOSIS — Z79.4 TYPE 2 DIABETES MELLITUS WITH DIABETIC NEUROPATHY, WITH LONG-TERM CURRENT USE OF INSULIN (HCC): Primary | ICD-10-CM

## 2022-10-20 DIAGNOSIS — I50.89 OTHER HEART FAILURE (HCC): ICD-10-CM

## 2022-10-20 DIAGNOSIS — Z79.4 TYPE 2 DIABETES MELLITUS WITH DIABETIC NEUROPATHY, WITH LONG-TERM CURRENT USE OF INSULIN (HCC): ICD-10-CM

## 2022-10-20 DIAGNOSIS — Z12.11 SCREENING FOR COLON CANCER: ICD-10-CM

## 2022-10-20 PROCEDURE — 99214 OFFICE O/P EST MOD 30 MIN: CPT | Performed by: NURSE PRACTITIONER

## 2022-10-20 PROCEDURE — 1123F ACP DISCUSS/DSCN MKR DOCD: CPT | Performed by: NURSE PRACTITIONER

## 2022-10-20 RX ORDER — ATORVASTATIN CALCIUM 80 MG/1
80 TABLET, FILM COATED ORAL DAILY
Qty: 90 TABLET | Refills: 1 | Status: SHIPPED | OUTPATIENT
Start: 2022-10-20

## 2022-10-20 RX ORDER — LISINOPRIL 40 MG/1
40 TABLET ORAL DAILY
Qty: 90 TABLET | Refills: 0 | Status: SHIPPED | OUTPATIENT
Start: 2022-10-20

## 2022-10-20 ASSESSMENT — ENCOUNTER SYMPTOMS
EYE PAIN: 0
RHINORRHEA: 0
COUGH: 1
SORE THROAT: 0
DIARRHEA: 0
NAUSEA: 0
SHORTNESS OF BREATH: 0
CONSTIPATION: 0
ABDOMINAL PAIN: 0
VOMITING: 0
SINUS PAIN: 0
BACK PAIN: 0

## 2022-10-20 ASSESSMENT — ANXIETY QUESTIONNAIRES
GAD7 TOTAL SCORE: 0
5. BEING SO RESTLESS THAT IT IS HARD TO SIT STILL: 0
6. BECOMING EASILY ANNOYED OR IRRITABLE: 0
7. FEELING AFRAID AS IF SOMETHING AWFUL MIGHT HAPPEN: 0
4. TROUBLE RELAXING: 0
1. FEELING NERVOUS, ANXIOUS, OR ON EDGE: 0
IF YOU CHECKED OFF ANY PROBLEMS ON THIS QUESTIONNAIRE, HOW DIFFICULT HAVE THESE PROBLEMS MADE IT FOR YOU TO DO YOUR WORK, TAKE CARE OF THINGS AT HOME, OR GET ALONG WITH OTHER PEOPLE: NOT DIFFICULT AT ALL
3. WORRYING TOO MUCH ABOUT DIFFERENT THINGS: 0
2. NOT BEING ABLE TO STOP OR CONTROL WORRYING: 0

## 2022-10-20 ASSESSMENT — PATIENT HEALTH QUESTIONNAIRE - PHQ9
SUM OF ALL RESPONSES TO PHQ QUESTIONS 1-9: 2
SUM OF ALL RESPONSES TO PHQ QUESTIONS 1-9: 2
1. LITTLE INTEREST OR PLEASURE IN DOING THINGS: 1
SUM OF ALL RESPONSES TO PHQ9 QUESTIONS 1 & 2: 2
2. FEELING DOWN, DEPRESSED OR HOPELESS: 1
SUM OF ALL RESPONSES TO PHQ QUESTIONS 1-9: 2
SUM OF ALL RESPONSES TO PHQ QUESTIONS 1-9: 2

## 2022-10-20 NOTE — PROGRESS NOTES
[address]  Tel# [phone]  Fax# [phone]       Daksha Cummins, FN-BC  Family Nurse Practitioner            Date of Visit: 10/20/2022     Emperatriz Loredo (: 1955) is a 75 y.o. male established patient, here for evaluation of the following chief complaint(s):    Chief Complaint   Patient presents with    Diabetes         Patient Care Team:  KULDEEP Sanchez CNP as PCP - General (Nurse Practitioner)  KULDEEP Sanchez CNP as PCP - [facility] Empaneled Provider  Lovely Gay MD as Surgeon         History of Present Illness    Presents here for diabetes follow up and for medication refills. Pt a transfer pt to me 2022 from [facility] Dr. Rakesh Jay. DM Type II  Chronic condition  Blood sugar monitoring: has not been checking lately  Medications: glucotrol, has been taking Novolog 3x a day and Tresiba 20 units every evening  Diet: Brunch 1p - bagel, coffee with sugar and milk          Dinner - chicken or tilapia; sometimes with potato; sometimes with veges (asparagus, tomato, lettuce)  Physical activity: walks his dog at times     Shelia Lab  Hemoglobin A1C <5.7 % 13.6 High     Mean Bld Glu Estim. Refer to Glucose mg/dL 344        Hypertension  Chronic condition  BP monitoring: not monitoring       Heart Failure  Last ECHO done 2022, EF 50-54%        CAD  Hx of NSTEMI, 2021, seen by Dr. Ana Lilia Jay        Cigarette Smoking  Has been smoking since age 14  Tried 1 PPD  Currently 1/2 PPD  Not motivated to quit at this time          HCM    Refused flu vaccines, Covid19 vaccine, refused vaccines in general.    There is no immunization history on file for this patient.      Colonoscopy: 2018  Eye exam: can't afford         Patient Active Problem List   Diagnosis    Hypothyroidism    Peripheral neuropathy    Type 2 diabetes mellitus with diabetic neuropathy ([facility]    Cerebrovascular disease    Diabetes mellitus type II, uncontrolled    Tobacco abuse    HTN (hypertension)    [facility]    Mixed hyperlipidemia    Coronary artery disease involving native coronary artery of native heart without angina pectoris    Acute heart failure with preserved ejection fraction ([facility]    Cigarette smoker    Pure hypercholesterolemia    Anemia       Past Medical History:   Diagnosis Date    Diabetes ([facility]     Diabetes mellitus type II, uncontrolled 2/28/2014    GERD (gastroesophageal reflux disease)     HTN (hypertension) 2/28/2014    Hypothyroidism 2/28/2014    NSTEMI (non-ST elevated myocardial infarction) ([facility] 01/28/2021    Peripheral neuropathy 2/28/2014    Psychiatric disorder     Thyroid disease     Tobacco abuse 2/28/2014       Past Surgical History:   Procedure Laterality Date    COLONOSCOPY  02/06/2012    due date 02/06/2015       Family History   Problem Relation Age of Onset    Alcohol Abuse Father          ALLERGY  Allergies   Allergen Reactions    Food Hives and Swelling     Onions and peppers    Other Hives     Peppers and onions           Current Outpatient Medications on File Prior to Visit   Medication Sig Dispense Refill    blood glucose monitor strips Test 4x times a day & as needed for symptoms of irregular blood glucose. Dispense sufficient amount for indicated testing frequency plus additional to accommodate PRN testing needs. Reli On. Max 4 glucose strips per day. 100 strip 5    NOVOLOG FLEXPEN RELION 100 UNIT/ML injection pen Inject 4 Units into the skin 3 times daily (before meals) 15 mL 5    levothyroxine (SYNTHROID) 200 MCG tablet Take 1 tablet by mouth in the morning. On empty stomach.  90 tablet 0    Insulin Degludec (TRESIBA FLEXTOUCH) 100 UNIT/ML SOPN Inject 20 Units into the skin daily 15 mL 5    Insulin Pen Needle (PEN NEEDLES) 32G X 4 MM MISC Use twice a day and as needed 100 each 5    aspirin 81 MG chewable tablet Take 81 mg by mouth in the morning.  clopidogrel (PLAVIX) 75 MG tablet       ferrous sulfate (IRON 325) 325 (65 Fe) MG tablet Take 325 mg by mouth in the morning.  carvedilol (COREG) 25 MG tablet Take 25 mg by mouth in the morning and 25 mg before bedtime.  Lancets 28G MISC Patient/Insurance Preference Use as directed 3 times a day.  furosemide (LASIX) 40 MG tablet Take 40 mg by mouth in the morning.  vitamin D (CHOLECALCIFEROL) 25 MCG (1000 UT) TABS tablet Take 1,000 Units by mouth in the morning. No current facility-administered medications on file prior to visit. Review of Systems  Review of Systems   Constitutional:  Negative for chills, fatigue and fever. HENT:  Negative for congestion, postnasal drip, rhinorrhea, sinus pain, sneezing and sore throat. Eyes:  Negative for pain and visual disturbance. Respiratory:  Positive for cough (Smoker's cough). Negative for shortness of breath. Cardiovascular:  Negative for chest pain, palpitations and leg swelling. Gastrointestinal:  Negative for abdominal pain, constipation, diarrhea, nausea and vomiting. Genitourinary:  Negative for dysuria, frequency and urgency. Musculoskeletal:  Negative for back pain, gait problem and joint swelling. Skin:  Negative for rash and wound. Neurological:  Positive for numbness (diabetic neuropathy). Negative for dizziness and headaches. Psychiatric/Behavioral:  Negative for behavioral problems, sleep disturbance and suicidal ideas. The patient is not nervous/anxious. Vitals:    10/20/22 1402   BP: 102/65   Site: Left Upper Arm   Position: Sitting   Cuff Size: Medium Adult   Pulse: 80   Resp: 15   Temp: 98 °F (36.7 °C)   TempSrc: Temporal   SpO2: 100%   Weight: 188 lb 9.6 oz (85.5 kg)   Height: 5' 5.16\" (1.655 m)              Physical Exam  Physical Exam  Constitutional:       General: He is not in acute distress. Appearance: He is not ill-appearing.    HENT:      Head: Normocephalic and atraumatic. Eyes:      Pupils: Pupils are equal, round, and reactive to light. Cardiovascular:      Rate and Rhythm: Normal rate and regular rhythm. Pulmonary:      Effort: Pulmonary effort is normal. No respiratory distress. Breath sounds: Normal breath sounds. Abdominal:      General: Bowel sounds are normal.      Palpations: Abdomen is soft. Musculoskeletal:         General: Normal range of motion. Cervical back: Neck supple. Right lower leg: Edema (1+ pitting) present. Left lower leg: Edema (1+) present. Skin:     General: Skin is warm and dry. Neurological:      General: No focal deficit present. Mental Status: He is alert and oriented to person, place, and time. Psychiatric:         Mood and Affect: Mood normal.         Thought Content: Thought content normal.      Diabetic foot exam:   Left Foot:   Visual Exam: callous- toenails long, thick, yellow (fungus)   Pulse DP: 2+ (normal)   Filament test: 6/6   Vibratory Sensation: normal  Right Foot:   Visual Exam: callous- toenails thick, yellow, (fungus)   Pulse DP: 2+ (normal)   Filament test: 6/6   Vibratory Sensation: diminished         Assessment/Plan:          ICD-10-CM    1. Type 2 diabetes mellitus with diabetic neuropathy, with long-term current use of insulin ([facility])  E11.40 Hemoglobin A1C    Z79.4 CBC with Auto Differential     atorvastatin (LIPITOR) 80 MG tablet     dapagliflozin (FARXIGA) 5 MG tablet    Cont Novolog insulin, Tresiba insulin; await updated labs      2. Hypothyroidism, unspecified type  E03.9 TSH with Reflex    Current Rx Levothyroxine 200 mcg, await for updated TSH level. 3. Essential hypertension  I10 lisinopril (PRINIVIL;ZESTRIL) 40 MG tablet      4.  Stage 3b chronic kidney disease (HCC)  N18.32 Comprehensive Metabolic Panel     Microalbumin / Creatinine Urine Ratio     atorvastatin (LIPITOR) 80 MG tablet     dapagliflozin (FARXIGA) 5 MG tablet    Avoid NSAIDs such as ibuprofen, sodas 5. Coronary artery disease involving native coronary artery of native heart without angina pectoris  I25.10     Cont Plavix. 6. Anemia, unspecified type  D64.9 CBC with Auto Differential    Con ferrous sulfate with vit C      7. Other heart failure ([facility]  I50.89 [facility]    Con Carvedilol, furosemide      8. Pure hypercholesterolemia  E78.00 Lipid Panel     atorvastatin (LIPITOR) 80 MG tablet      9. Cigarette smoker  F17.210     Advised on smoking cessation. Reviewed CV and CA risks. 10. Screening for colon cancer  Z12.11 Cologuard (Fecal DNA Colorectal Cancer Screening)      11. Body mass index 31.0-31.9, adult  Z68.31                Kim was seen today for diabetes. Diagnoses and all orders for this visit:    Type 2 diabetes mellitus with diabetic neuropathy, with long-term current use of insulin ([facility])  Comments:  Cont Novolog insulin, Tresiba insulin; await updated labs  Orders:  -     Hemoglobin A1C; Future  -     CBC with Auto Differential; Future  -     atorvastatin (LIPITOR) 80 MG tablet; Take 1 tablet by mouth daily  -     dapagliflozin (FARXIGA) 5 MG tablet; Take 1 tablet by mouth every morning    Hypothyroidism, unspecified type  Comments:  Current Rx Levothyroxine 200 mcg, await for updated TSH level. Orders:  -     TSH with Reflex; Future    Essential hypertension  -     lisinopril (PRINIVIL;ZESTRIL) 40 MG tablet; Take 1 tablet by mouth daily    Stage 3b chronic kidney disease ([facility]  Comments:  Avoid NSAIDs such as ibuprofen, sodas  Orders:  -     Comprehensive Metabolic Panel; Future  -     Microalbumin / Creatinine Urine Ratio; Future  -     atorvastatin (LIPITOR) 80 MG tablet; Take 1 tablet by mouth daily  -     dapagliflozin (FARXIGA) 5 MG tablet; Take 1 tablet by mouth every morning    Coronary artery disease involving native coronary artery of native heart without angina pectoris  Comments:  Cont Plavix.     Anemia, unspecified type  Comments:  Con ferrous sulfate with vit C  Orders:  -     CBC with Auto Differential; Future    Other heart failure ([address]  Comments:  Con Carvedilol, furosemide  Orders:  -     Sharron Pederson hypercholesterolemia  -     Lipid Panel; Future  -     atorvastatin (LIPITOR) 80 MG tablet; Take 1 tablet by mouth daily    Cigarette smoker  Comments:  Advised on smoking cessation. Reviewed CV and CA risks. Screening for colon cancer  -     Cologuard (Fecal DNA Colorectal Cancer Screening)    Body mass index 31.0-31.9, adult         Advised on low salt, low carbohydrate ,low cholesterol, high fiber, nutrient dense, whole foods. Advised on portion control. Advised to avoid sweet foods, sweet drinks. Advised to read food labels. Advised on exercise or physical activity at least 30 minutes, 3-5 days a week, as tolerated. Advised on CV risks with diabetes mellitus and hyperlipidemia.            Orders Placed This Encounter   Procedures    Cologuard (Fecal DNA Colorectal Cancer Screening)    Hemoglobin A1C     Standing Status:   Future     Number of Occurrences:   1     Standing Expiration Date:   1/20/2023    CBC with Auto Differential     Standing Status:   Future     Number of Occurrences:   1     Standing Expiration Date:   10/20/2023    Comprehensive Metabolic Panel     Standing Status:   Future     Number of Occurrences:   1     Standing Expiration Date:   1/20/2023    Lipid Panel     Standing Status:   Future     Number of Occurrences:   1     Standing Expiration Date:   1/20/2023     Order Specific Question:   Is Patient Fasting?/# of Hours     Answer:   No    TSH with Reflex     Standing Status:   Future     Number of Occurrences:   1     Standing Expiration Date:   10/20/2023    Microalbumin / Creatinine Urine Ratio     Standing Status:   Future     Number of Occurrences:   1     Standing Expiration Date:   10/20/2023   [facility] Card[facility] [place]     Referral Priority:   Routine Referral Type:   Eval and Treat     Referral Reason:   Specialty Services Required     Requested Specialty:   Cardiology     Number of Visits Requested:   1                    Follow Up  Return in about 3 months (around 1/20/2023), or if symptoms worsen or fail to improve, for ROV with labs.              Janet Gaffney, DNP, FNP-BC

## 2022-10-21 LAB
ALBUMIN SERPL-MCNC: 3 G/DL (ref 3.2–4.6)
ALBUMIN/GLOB SERPL: 0.8 {RATIO} (ref 0.4–1.6)
ALP SERPL-CCNC: 150 U/L (ref 50–136)
ALT SERPL-CCNC: 30 U/L (ref 12–65)
ANION GAP SERPL CALC-SCNC: 9 MMOL/L (ref 2–11)
AST SERPL-CCNC: 15 U/L (ref 15–37)
BASOPHILS # BLD: 0.1 K/UL (ref 0–0.2)
BASOPHILS NFR BLD: 1 % (ref 0–2)
BILIRUB SERPL-MCNC: 0.3 MG/DL (ref 0.2–1.1)
BUN SERPL-MCNC: 33 MG/DL (ref 8–23)
CALCIUM SERPL-MCNC: 9.3 MG/DL (ref 8.3–10.4)
CHLORIDE SERPL-SCNC: 105 MMOL/L (ref 101–110)
CHOLEST SERPL-MCNC: 150 MG/DL
CO2 SERPL-SCNC: 24 MMOL/L (ref 21–32)
CREAT SERPL-MCNC: 1.86 MG/DL (ref 0.8–1.5)
CREAT UR-MCNC: 196 MG/DL
DIFFERENTIAL METHOD BLD: ABNORMAL
EOSINOPHIL # BLD: 0.4 K/UL (ref 0–0.8)
EOSINOPHIL NFR BLD: 4 % (ref 0.5–7.8)
ERYTHROCYTE [DISTWIDTH] IN BLOOD BY AUTOMATED COUNT: 13.9 % (ref 11.9–14.6)
EST. AVERAGE GLUCOSE BLD GHB EST-MCNC: 260 MG/DL
GLOBULIN SER CALC-MCNC: 3.6 G/DL (ref 2.8–4.5)
GLUCOSE SERPL-MCNC: 429 MG/DL (ref 65–100)
HBA1C MFR BLD: 10.7 % (ref 4.8–5.6)
HCT VFR BLD AUTO: 42.9 % (ref 41.1–50.3)
HDLC SERPL-MCNC: 30 MG/DL (ref 40–60)
HDLC SERPL: 5 {RATIO}
HGB BLD-MCNC: 13.3 G/DL (ref 13.6–17.2)
IMM GRANULOCYTES # BLD AUTO: 0 K/UL (ref 0–0.5)
IMM GRANULOCYTES NFR BLD AUTO: 0 % (ref 0–5)
LDLC SERPL CALC-MCNC: 70.2 MG/DL
LYMPHOCYTES # BLD: 1.7 K/UL (ref 0.5–4.6)
LYMPHOCYTES NFR BLD: 19 % (ref 13–44)
MCH RBC QN AUTO: 27.9 PG (ref 26.1–32.9)
MCHC RBC AUTO-ENTMCNC: 31 G/DL (ref 31.4–35)
MCV RBC AUTO: 89.9 FL (ref 82–102)
MICROALBUMIN UR-MCNC: 75 MG/DL
MICROALBUMIN/CREAT UR-RTO: 383 MG/G
MONOCYTES # BLD: 0.6 K/UL (ref 0.1–1.3)
MONOCYTES NFR BLD: 7 % (ref 4–12)
NEUTS SEG # BLD: 6.2 K/UL (ref 1.7–8.2)
NEUTS SEG NFR BLD: 69 % (ref 43–78)
NRBC # BLD: 0 K/UL (ref 0–0.2)
PLATELET # BLD AUTO: 321 K/UL (ref 150–450)
PMV BLD AUTO: 11.4 FL (ref 9.4–12.3)
POTASSIUM SERPL-SCNC: 4.4 MMOL/L (ref 3.5–5.1)
PROT SERPL-MCNC: 6.6 G/DL (ref 6.3–8.2)
RBC # BLD AUTO: 4.77 M/UL (ref 4.23–5.6)
SODIUM SERPL-SCNC: 138 MMOL/L (ref 133–143)
TRIGL SERPL-MCNC: 249 MG/DL (ref 35–150)
TSH W FREE THYROID IF ABNORMAL: 2.01 UIU/ML (ref 0.36–3.74)
VLDLC SERPL CALC-MCNC: 49.8 MG/DL (ref 6–23)
WBC # BLD AUTO: 9 K/UL (ref 4.3–11.1)

## 2022-10-24 DIAGNOSIS — E11.40 TYPE 2 DIABETES MELLITUS WITH DIABETIC NEUROPATHY, WITH LONG-TERM CURRENT USE OF INSULIN (HCC): ICD-10-CM

## 2022-10-24 DIAGNOSIS — E03.9 HYPOTHYROIDISM, UNSPECIFIED TYPE: ICD-10-CM

## 2022-10-24 DIAGNOSIS — Z79.4 TYPE 2 DIABETES MELLITUS WITH DIABETIC NEUROPATHY, WITH LONG-TERM CURRENT USE OF INSULIN (HCC): ICD-10-CM

## 2022-10-24 RX ORDER — INSULIN ASPART 100 [IU]/ML
4 INJECTION, SOLUTION INTRAVENOUS; SUBCUTANEOUS
Qty: 15 ML | Refills: 5 | Status: SHIPPED | OUTPATIENT
Start: 2022-10-24

## 2022-10-24 RX ORDER — INSULIN DEGLUDEC INJECTION 100 U/ML
20 INJECTION, SOLUTION SUBCUTANEOUS DAILY
Qty: 15 ML | Refills: 5 | Status: SHIPPED | OUTPATIENT
Start: 2022-10-24

## 2022-10-24 RX ORDER — LEVOTHYROXINE SODIUM 0.2 MG/1
200 TABLET ORAL DAILY
Qty: 90 TABLET | Refills: 0 | Status: SHIPPED | OUTPATIENT
Start: 2022-10-24

## 2023-02-06 ENCOUNTER — TELEPHONE (OUTPATIENT)
Dept: INTERNAL MEDICINE CLINIC | Facility: CLINIC | Age: 68
End: 2023-02-06

## 2023-02-06 NOTE — TELEPHONE ENCOUNTER
----- Message from KULDEEP Kennedy CNP sent at 2/6/2023 11:40 AM EST -----  Regarding: Resched ROV with fasting labs (Diabetes)  Pt with canceled appt on 1/23/2023 with no current rescheduled appt. Pt no show on 10/2022 appt (letter was sent). Pt with uncontrolled diabetes  and with other chronic medical conditions. Thank you. Edgar Ackerman NP  [facility]  [phone]      I contacted the pt. He stated he has not come to his appts due to a lack of transportation. I explained to him that we have a free ride service available to pts without transportation. He verbalized understanding and was agreeable to an appt.   He was scheduled for 3/6/23 at 0940, and a ride was scheduled for him through our [email or web address].

## 2023-07-24 ENCOUNTER — TELEPHONE (OUTPATIENT)
Dept: INTERNAL MEDICINE CLINIC | Facility: CLINIC | Age: 68
End: 2023-07-24

## 2023-07-24 ENCOUNTER — CARE COORDINATION (OUTPATIENT)
Dept: CARE COORDINATION | Facility: CLINIC | Age: 68
End: 2023-07-24

## 2023-07-24 DIAGNOSIS — Z91.199 NONCOMPLIANCE WITH TREATMENT REGIMEN: Primary | ICD-10-CM

## 2023-07-24 NOTE — TELEPHONE ENCOUNTER
I called and spoke with the pt. He states he has not returned to Dr. Chyna George, but he \"just doesn't want to make an appt. \"  I will forward to Georgina Tavera NP as an [facility]

## 2023-07-24 NOTE — CARE COORDINATION
Ambulatory Care Management Outreach Attempt    This patient was received as a referral from  Provider for case management of ed utilizer. Attempted to reach patient for ED follow up. Unable to reach patient, will attempt again on . Patient: Abigail Neal Patient : 1955 MRN: [id number]    Last Discharge [address] [facility]       None                Noted following upcoming appointments from discharge chart review:   [facility] follow up appointment(s): No future appointments.   Non-[facility] follow up appointment(s):

## 2023-07-25 ENCOUNTER — CARE COORDINATION (OUTPATIENT)
Dept: CARE COORDINATION | Facility: CLINIC | Age: 68
End: 2023-07-25

## 2023-07-25 NOTE — CARE COORDINATION
2nd attempt to reach pt to offer ACM services. Unable to connect with pt, will attempt again in 5 working days, on 8/1.

## 2023-07-31 ENCOUNTER — CARE COORDINATION (OUTPATIENT)
Dept: CARE COORDINATION | Facility: CLINIC | Age: 68
End: 2023-07-31

## 2023-07-31 NOTE — CARE COORDINATION
3rd attempt to reach patient to offer ACM services. Unable to reach patient and a message was left  I will have a letter sent and ACM is signing off. Dear Ulysses Kirk    My name is Phu HAGEN, NED     and I am a Ambulatory Care Manager who partners with your PCP to improve patients' health. I've been trying to reach you via phone to let you know that, as a member of your care team, I will work with other providers involved in your care, offer education for your specific health conditions, and connect you with more resources as needed. This program is designed to provide you with the opportunity to have a ([address] [place] [address]/[address]) care manager partner with you for the following situations:     1) if you come home from the hospital or emergency room   2) to help manage your disease   3) when you would like assistance coordinating services or appointments    This added support is provided at no additional cost to you. My primary focus is to help you achieve specific goals and improve your health. Please call me to further discuss how I can support your health care needs.     Sincerely,    Phu WHITE/RN  Daytime contact number [phone]

## 2023-09-06 LAB
LEFT VENTRICULAR EJECTION FRACTION HIGH VALUE: 45 %
LEFT VENTRICULAR EJECTION FRACTION MODE: NORMAL
LV EF: 40 %

## 2023-09-11 ENCOUNTER — TELEPHONE (OUTPATIENT)
Dept: INTERNAL MEDICINE CLINIC | Facility: CLINIC | Age: 68
End: 2023-09-11

## 2023-09-11 NOTE — TELEPHONE ENCOUNTER
Please call and offer hospital follow up. Pt's previous PCP Dr. Rukhsana Larson of [facility] Internal Medicine. Our office has been trying to offer him  routine follow up this year.  Pt no show and  canceled as below    No show 10/3/2022  Canceled 1/23/2023  3/6/23 pt canceled  3/7/23 pt canceled

## 2023-09-14 ENCOUNTER — OFFICE VISIT (OUTPATIENT)
Dept: INTERNAL MEDICINE CLINIC | Facility: CLINIC | Age: 68
End: 2023-09-14

## 2023-09-14 VITALS
SYSTOLIC BLOOD PRESSURE: 108 MMHG | DIASTOLIC BLOOD PRESSURE: 67 MMHG | OXYGEN SATURATION: 100 % | TEMPERATURE: 98.5 F | HEART RATE: 72 BPM

## 2023-09-14 DIAGNOSIS — D63.1 ANEMIA DUE TO STAGE 3A CHRONIC KIDNEY DISEASE (HCC): ICD-10-CM

## 2023-09-14 DIAGNOSIS — N18.31 ANEMIA DUE TO STAGE 3A CHRONIC KIDNEY DISEASE (HCC): ICD-10-CM

## 2023-09-14 DIAGNOSIS — E11.40 TYPE 2 DIABETES MELLITUS WITH DIABETIC NEUROPATHY, WITH LONG-TERM CURRENT USE OF INSULIN (HCC): ICD-10-CM

## 2023-09-14 DIAGNOSIS — Z79.4 TYPE 2 DIABETES MELLITUS WITH DIABETIC NEUROPATHY, WITH LONG-TERM CURRENT USE OF INSULIN (HCC): ICD-10-CM

## 2023-09-14 DIAGNOSIS — E03.9 HYPOTHYROIDISM, UNSPECIFIED TYPE: ICD-10-CM

## 2023-09-14 DIAGNOSIS — N18.32 STAGE 3B CHRONIC KIDNEY DISEASE (HCC): ICD-10-CM

## 2023-09-14 DIAGNOSIS — I10 ESSENTIAL HYPERTENSION: ICD-10-CM

## 2023-09-14 DIAGNOSIS — R10.9 LEFT SIDED ABDOMINAL PAIN: ICD-10-CM

## 2023-09-14 DIAGNOSIS — Z09 HOSPITAL DISCHARGE FOLLOW-UP: Primary | ICD-10-CM

## 2023-09-14 DIAGNOSIS — R53.81 DEBILITY: ICD-10-CM

## 2023-09-14 DIAGNOSIS — E78.00 PURE HYPERCHOLESTEROLEMIA: ICD-10-CM

## 2023-09-14 RX ORDER — LEVOTHYROXINE SODIUM 0.2 MG/1
200 TABLET ORAL DAILY
Qty: 90 TABLET | Refills: 0 | Status: SHIPPED | OUTPATIENT
Start: 2023-09-14

## 2023-09-14 RX ORDER — DOCUSATE SODIUM 100 MG/1
100 CAPSULE, LIQUID FILLED ORAL DAILY PRN
Qty: 90 CAPSULE | Refills: 0 | Status: SHIPPED | OUTPATIENT
Start: 2023-09-14

## 2023-09-14 RX ORDER — ASPIRIN 81 MG/1
81 TABLET, CHEWABLE ORAL DAILY
Qty: 90 TABLET | Refills: 1 | Status: CANCELLED | OUTPATIENT
Start: 2023-09-14

## 2023-09-14 RX ORDER — METOPROLOL SUCCINATE 25 MG/1
25 TABLET, EXTENDED RELEASE ORAL DAILY
Qty: 90 TABLET | Refills: 1 | Status: CANCELLED | OUTPATIENT
Start: 2023-09-14

## 2023-09-14 RX ORDER — GUAIFENESIN 400 MG/1
400 TABLET ORAL 4 TIMES DAILY PRN
Qty: 360 TABLET | Refills: 1 | Status: CANCELLED | OUTPATIENT
Start: 2023-09-14

## 2023-09-14 RX ORDER — LANCETS 33 GAUGE
1 EACH MISCELLANEOUS 4 TIMES DAILY
Qty: 400 EACH | Refills: 1 | Status: SHIPPED | OUTPATIENT
Start: 2023-09-14

## 2023-09-14 RX ORDER — NICOTINE 21 MG/24HR
1 PATCH, TRANSDERMAL 24 HOURS TRANSDERMAL DAILY
Qty: 30 PATCH | Refills: 2 | Status: CANCELLED | OUTPATIENT
Start: 2023-09-14 | End: 2023-12-13

## 2023-09-14 RX ORDER — NICOTINE 21 MG/24HR
1 PATCH, TRANSDERMAL 24 HOURS TRANSDERMAL DAILY
Qty: 30 PATCH | Refills: 2 | COMMUNITY
Start: 2023-09-11 | End: 2023-12-10

## 2023-09-14 RX ORDER — LANCETS 33 GAUGE
1 EACH MISCELLANEOUS 4 TIMES DAILY
COMMUNITY
End: 2023-09-14 | Stop reason: SDUPTHER

## 2023-09-14 RX ORDER — FERROUS SULFATE 325(65) MG
325 TABLET ORAL DAILY
Qty: 90 TABLET | Refills: 0 | Status: SHIPPED | OUTPATIENT
Start: 2023-09-14

## 2023-09-14 RX ORDER — LISINOPRIL 40 MG/1
40 TABLET ORAL DAILY
Qty: 90 TABLET | Refills: 1 | Status: CANCELLED | OUTPATIENT
Start: 2023-09-14

## 2023-09-14 RX ORDER — LEVOTHYROXINE SODIUM 0.2 MG/1
200 TABLET ORAL DAILY
Qty: 90 TABLET | Refills: 1 | Status: CANCELLED | OUTPATIENT
Start: 2023-09-14

## 2023-09-14 RX ORDER — INSULIN ASPART 100 [IU]/ML
4 INJECTION, SOLUTION INTRAVENOUS; SUBCUTANEOUS
Qty: 15 ML | Refills: 5 | Status: CANCELLED | OUTPATIENT
Start: 2023-09-14

## 2023-09-14 RX ORDER — PEN NEEDLE, DIABETIC 30 GX3/16"
NEEDLE, DISPOSABLE MISCELLANEOUS
Qty: 100 EACH | Refills: 5 | Status: SHIPPED | OUTPATIENT
Start: 2023-09-14

## 2023-09-14 RX ORDER — ATORVASTATIN CALCIUM 80 MG/1
80 TABLET, FILM COATED ORAL DAILY
Qty: 90 TABLET | Refills: 1 | Status: CANCELLED | OUTPATIENT
Start: 2023-09-14

## 2023-09-14 RX ORDER — INSULIN DEGLUDEC INJECTION 100 U/ML
20 INJECTION, SOLUTION SUBCUTANEOUS DAILY
Qty: 15 ML | Refills: 5 | Status: CANCELLED | OUTPATIENT
Start: 2023-09-14

## 2023-09-14 RX ORDER — GLUCOSAMINE HCL/CHONDROITIN SU 500-400 MG
CAPSULE ORAL
Qty: 100 STRIP | Refills: 5 | Status: CANCELLED | OUTPATIENT
Start: 2023-09-14

## 2023-09-14 RX ORDER — METOPROLOL SUCCINATE 25 MG/1
1 TABLET, EXTENDED RELEASE ORAL DAILY
COMMUNITY
Start: 2023-09-10

## 2023-09-14 RX ORDER — GABAPENTIN 100 MG/1
200 CAPSULE ORAL 3 TIMES DAILY
Qty: 540 CAPSULE | Refills: 1 | Status: CANCELLED | OUTPATIENT
Start: 2023-09-14 | End: 2023-12-13

## 2023-09-14 RX ORDER — GABAPENTIN 100 MG/1
2 CAPSULE ORAL 3 TIMES DAILY
COMMUNITY
Start: 2023-09-10

## 2023-09-14 RX ORDER — GUAIFENESIN 400 MG/1
400 TABLET ORAL 4 TIMES DAILY PRN
COMMUNITY

## 2023-09-14 SDOH — ECONOMIC STABILITY: INCOME INSECURITY: HOW HARD IS IT FOR YOU TO PAY FOR THE VERY BASICS LIKE FOOD, HOUSING, MEDICAL CARE, AND HEATING?: NOT HARD AT ALL

## 2023-09-14 SDOH — ECONOMIC STABILITY: FOOD INSECURITY: WITHIN THE PAST 12 MONTHS, YOU WORRIED THAT YOUR FOOD WOULD RUN OUT BEFORE YOU GOT MONEY TO BUY MORE.: NEVER TRUE

## 2023-09-14 SDOH — ECONOMIC STABILITY: FOOD INSECURITY: WITHIN THE PAST 12 MONTHS, THE FOOD YOU BOUGHT JUST DIDN'T LAST AND YOU DIDN'T HAVE MONEY TO GET MORE.: NEVER TRUE

## 2023-09-14 SDOH — ECONOMIC STABILITY: HOUSING INSECURITY
IN THE LAST 12 MONTHS, WAS THERE A TIME WHEN YOU DID NOT HAVE A STEADY PLACE TO SLEEP OR SLEPT IN A SHELTER (INCLUDING NOW)?: NO

## 2023-09-14 ASSESSMENT — PATIENT HEALTH QUESTIONNAIRE - PHQ9
SUM OF ALL RESPONSES TO PHQ QUESTIONS 1-9: 2
SUM OF ALL RESPONSES TO PHQ9 QUESTIONS 1 & 2: 2
SUM OF ALL RESPONSES TO PHQ QUESTIONS 1-9: 2
2. FEELING DOWN, DEPRESSED OR HOPELESS: 1
1. LITTLE INTEREST OR PLEASURE IN DOING THINGS: 1

## 2023-09-14 ASSESSMENT — ANXIETY QUESTIONNAIRES
7. FEELING AFRAID AS IF SOMETHING AWFUL MIGHT HAPPEN: 0
2. NOT BEING ABLE TO STOP OR CONTROL WORRYING: 0
5. BEING SO RESTLESS THAT IT IS HARD TO SIT STILL: 0
GAD7 TOTAL SCORE: 1
1. FEELING NERVOUS, ANXIOUS, OR ON EDGE: 1
IF YOU CHECKED OFF ANY PROBLEMS ON THIS QUESTIONNAIRE, HOW DIFFICULT HAVE THESE PROBLEMS MADE IT FOR YOU TO DO YOUR WORK, TAKE CARE OF THINGS AT HOME, OR GET ALONG WITH OTHER PEOPLE: NOT DIFFICULT AT ALL
4. TROUBLE RELAXING: 0
3. WORRYING TOO MUCH ABOUT DIFFERENT THINGS: 0
6. BECOMING EASILY ANNOYED OR IRRITABLE: 0

## 2023-09-14 ASSESSMENT — ENCOUNTER SYMPTOMS
VOMITING: 0
CONSTIPATION: 0
NAUSEA: 0
ABDOMINAL PAIN: 1
DIARRHEA: 0
RHINORRHEA: 0
BACK PAIN: 0
SORE THROAT: 0
COUGH: 0
EYE PAIN: 0
SINUS PAIN: 0
SHORTNESS OF BREATH: 0

## 2023-09-14 NOTE — PROGRESS NOTES
[facility]  [address] [facility]  Tel# [phone]  Fax# [phone]     Arabella Shahid, [facility]  Family Nurse Practitioner            Date of Visit: 2023     Viraj Silverio (: 1955) is a 70 y.o. male  established established patient, here for evaluation of the following chief complaint(s):    Chief Complaint   Patient presents with    Follow-Up from Hospital     The pt is in for a hosp f/u after a fall, 3 rib frx, and \"possible stroke\". They need to discuss DME orders for a wheelchair, bedside commode, large mattress pads, hosp bed, adult wash wipes, and med to large pull-ups. She would also like to see if they can get the rolling table for him to place food and other items on. He will be getting HH, as well, and we need to follow these orders. Patient Care Team:  KULDEEP Salcedo - CNP as PCP - General (Nurse Practitioner Family)  Dorothy Fitch MD as Surgeon         History of Present Illness        Presents here as a returning pt with daughter for hospital follow up. LOV with me 10/20/2022. Pt with multiple cancellations with me:  No show 10/3/2022  Canceled 2023  3/6/23 pt canceled  3/7/23 pt canceled  Office called pt 2023 and 2023.         Hospital Follow Up  Pt admitted to [facility] on 2023, discharged on 2023 with the following hospital diagnoses:    Pneumonia of left lung due to infectious organism, unspecified part of lung    Chest pain with high risk of acute coronary syndrome    Uncontrolled type 2 diabetes mellitus with hyperglycemia ([address])    Type 2 diabetes mellitus with diabetic neuropathy, unspecified whether long term insulin use ([address])    Primary hypertension    Coronary artery disease involving native coronary artery of native heart without angina pectoris    Tobacco abuse    Pure hypercholesterolemia    Chest pain of unknown etiology    Syncope and collapse    Debility    Polyneuropathy
Rhythm: Normal rate and regular rhythm. Pulmonary:      Effort: Pulmonary effort is normal. No respiratory distress. Breath sounds: Normal breath sounds. Abdominal:      General: Bowel sounds are normal.      Palpations: Abdomen is soft. Tenderness: There is abdominal tenderness (left lower abd). Comments: Upper quadrants tympanic to percussion   Musculoskeletal:      Cervical back: Neck supple. Comments: Pt sitting up on wheel chair   Skin:     General: Skin is warm and dry. Neurological:      General: No focal deficit present. Mental Status: He is alert and oriented to person, place, and time. Psychiatric:         Thought Content: Thought content normal.      Comments: Nonchalant affect              Assessment/Plan:          ICD-10-CM    1. Hospital discharge follow-up  Z09 SC DISCHARGE MEDS RECONCILED W/ CURRENT OUTPATIENT MED LIST      2. Type 2 diabetes mellitus with diabetic neuropathy, with long-term current use of insulin ([facility])  E11.40 Insulin Pen Needle (PEN NEEDLES) 32G X 4 MM MISC    Z79.4 Lancets 33G [facility]    Cont Glipizide 10 mg oral twice, start Tresiba 20 units SQ once a day (prescribed last visit), increased Novolog 4 units to three times a day before meals. 3. Left sided abdominal pain  R10.9 CANCELED: XR ABDOMEN (2 VIEWS)      4. Essential hypertension  I10 [address] [facility]      5. Anemia due to stage 3a chronic kidney disease (HCC)  N18.31 Ascorbic Acid 500 MG CHEW    D63.1 docusate sodium (COLACE) 100 MG capsule     ferrous sulfate (IRON 325) 325 (65 Fe) MG tablet      6. Hypothyroidism, unspecified type  E03.9 levothyroxine (SYNTHROID) 200 MCG tablet      7. Debility  R53.81 [address] [facility]     DME SUPPLY ORDER      8. Pure hypercholesterolemia  E78.00       9.  Stage 3b chronic kidney disease (HCC)  N18.32     Avoid NSAIDs such as ibuprofen, sodas               Kim was seen today for follow-up from

## 2023-09-15 ENCOUNTER — HOME HEALTH ADMISSION (OUTPATIENT)
Dept: HOME HEALTH SERVICES | Facility: HOME HEALTH | Age: 68
End: 2023-09-15
Payer: MEDICARE

## 2023-09-18 ENCOUNTER — TELEPHONE (OUTPATIENT)
Dept: INTERNAL MEDICINE CLINIC | Facility: CLINIC | Age: 68
End: 2023-09-18

## 2023-09-18 NOTE — TELEPHONE ENCOUNTER
Marcia Cho NP ordered DME Supplies for the pt to have a bedside commode, hospital bed, and wheelchair. This order was faxed, along with the office note, and insurance card to [facility] at f# [phone]  A fax confirmation was received.

## 2023-09-19 ENCOUNTER — HOME CARE VISIT (OUTPATIENT)
Dept: SCHEDULING | Facility: HOME HEALTH | Age: 68
End: 2023-09-19

## 2023-09-19 VITALS
OXYGEN SATURATION: 98 % | HEART RATE: 68 BPM | RESPIRATION RATE: 16 BRPM | DIASTOLIC BLOOD PRESSURE: 72 MMHG | SYSTOLIC BLOOD PRESSURE: 120 MMHG

## 2023-09-19 PROCEDURE — G0299 HHS/HOSPICE OF RN EA 15 MIN: HCPCS

## 2023-09-19 PROCEDURE — 0221000100 HH NO PAY CLAIM PROCEDURE

## 2023-09-19 ASSESSMENT — ENCOUNTER SYMPTOMS
HEMOPTYSIS: 0
CONSTIPATION: 1
PAIN LOCATION - PAIN QUALITY: ACHE
DYSPNEA ACTIVITY LEVEL: AFTER AMBULATING LESS THAN 10 FT

## 2023-09-20 ENCOUNTER — HOME CARE VISIT (OUTPATIENT)
Dept: SCHEDULING | Facility: HOME HEALTH | Age: 68
End: 2023-09-20

## 2023-09-20 VITALS
RESPIRATION RATE: 18 BRPM | TEMPERATURE: 97.2 F | DIASTOLIC BLOOD PRESSURE: 70 MMHG | OXYGEN SATURATION: 98 % | HEART RATE: 72 BPM | SYSTOLIC BLOOD PRESSURE: 122 MMHG

## 2023-09-20 PROCEDURE — G0151 HHCP-SERV OF PT,EA 15 MIN: HCPCS

## 2023-09-20 PROCEDURE — G0152 HHCP-SERV OF OT,EA 15 MIN: HCPCS

## 2023-09-20 NOTE — CASE COMMUNICATION
SN for admit    Patient at [facility] 9/5/2023 for chest pain and generalized body aches. Labs troponin elevated 0.19 and 9/6/2023 0.28, WBC 18.5, Neutrophils 16.3. Chest X-ray showed no pneumothorax does appear to be a new focal opacity in the left lung. Treated with antibiotics. Per MD Patient's chest pain seems to have resolved currently (0158 on 9/5/2023) but does c/o mark lower ext. neuropathy. Stabilized and discharge home 9/10/2023. Seen in PCP on 9/14/2023 for a follow-up visit regarding resent hospitalization and Care Plan going forward. PMH: Type 2 DM+(uncontrolled), CAD, GERD, HTN, Hypothyroidism, NSTEMI, Peripheral neuropathy, Psychiatric disorder, smoker, Stage 3 Chronic Kidney Disease, Acute heart failure with preserved EF, anemia     Pt sitting in wc at door when sn arrives. Pt weak and fragile in appearence. pt lives alone but states he has family memb ers that check on him daily and assist with appointments, transporation, food/shopping. Pt meds in home but states not taking regularly. Pt missing lancet pen for glucometer and pt states he has not been monitoring blood sugars for weeks. Pt refuses smoking cessation. Sn unable to reach pt daughter by phone. Cg needs to participate in education/medication compliance/dm testing. Sn notified NP of above and possibility of pt dc from [facility] if remains noncompliant.     Sn 2 wk 3,  1 wk 3, 3 prn

## 2023-09-21 ENCOUNTER — HOME CARE VISIT (OUTPATIENT)
Dept: SCHEDULING | Facility: HOME HEALTH | Age: 68
End: 2023-09-21

## 2023-09-21 ENCOUNTER — TELEPHONE (OUTPATIENT)
Dept: INTERNAL MEDICINE CLINIC | Facility: CLINIC | Age: 68
End: 2023-09-21

## 2023-09-21 VITALS
RESPIRATION RATE: 16 BRPM | SYSTOLIC BLOOD PRESSURE: 106 MMHG | HEART RATE: 63 BPM | DIASTOLIC BLOOD PRESSURE: 60 MMHG | TEMPERATURE: 98.4 F | OXYGEN SATURATION: 98 %

## 2023-09-21 DIAGNOSIS — Z79.4 TYPE 2 DIABETES MELLITUS WITH DIABETIC NEUROPATHY, WITH LONG-TERM CURRENT USE OF INSULIN (HCC): Primary | ICD-10-CM

## 2023-09-21 DIAGNOSIS — E11.40 TYPE 2 DIABETES MELLITUS WITH DIABETIC NEUROPATHY, WITH LONG-TERM CURRENT USE OF INSULIN (HCC): Primary | ICD-10-CM

## 2023-09-21 DIAGNOSIS — I50.89 OTHER HEART FAILURE (HCC): ICD-10-CM

## 2023-09-21 PROCEDURE — G0299 HHS/HOSPICE OF RN EA 15 MIN: HCPCS

## 2023-09-21 RX ORDER — LANCETS 30 GAUGE
EACH MISCELLANEOUS
Qty: 200 EACH | Refills: 5 | Status: SHIPPED | OUTPATIENT
Start: 2023-09-21

## 2023-09-21 RX ORDER — GLUCOSAMINE HCL/CHONDROITIN SU 500-400 MG
CAPSULE ORAL
Qty: 200 STRIP | Refills: 5 | Status: SHIPPED | OUTPATIENT
Start: 2023-09-21

## 2023-09-21 NOTE — CASE COMMUNICATION
S:  Patient is a/an 70year old male who lives in a single level home alone, but daughter is there most of the time. Patient at [facility] 9/5/2023 for chest pain and generalized body aches. Labs troponin elevated 0.19 and 9/6/2023 0.28, WBC 18.5, Neutrophils 16.3. Chest X-ray showed no pneumothorax does appear to be a new focal opacity in the left lung. Treated with antibiotics. Per MD Patient's chest pain seems to have resolved currently  (0158 on 9/5/2023) but does c/o mark lower ext. neuropathy. Stabilized and discharge home 9/10/2023. Seen in PCP on 9/14/2023 for a follow-up visit regarding resent hospitalization and Care Plan going forward. Diagnosis: Type 2 DM+ with Diabetic Neuropathy    B:  PMH: Type 2 DM+(uncontrolled), CAD, GERD, HTN, Hypothyroidism, NSTEMI, Peripheral neuropathy, Psychiatric disorder, smoker, Stage 3 Chronic Kidney Disease, Acute heart fail ure with preserved EF, anemia     A/R:  Patient demonstrates good strength, ROM and coordination of upper body. Patient having difficulty with ADL's, transfers to and from bed, toilet and shower. Patient is in a wc and has a tub transfer bench and bed cane. He has ordered a bed side commode and I have recommended a  long handled curved sponge and hand held shower nozzle. OT will see for 2w3. 1w1.

## 2023-09-21 NOTE — PROGRESS NOTES
[facility] called and asked for medication clarification regarding diabetic medications and diabetic supplies. Called and spoke to pt, pt stated glucometer brand Even Care G3. Fasting AM blood sugar goal for pt 80 - 120 as tolerated. Updated Nicole. Nicole verbalized understanding.

## 2023-09-21 NOTE — TELEPHONE ENCOUNTER
We received a detailed written order form from [address] [place] [address], NP filled this out for his Bedside Commode, Standard Wheelchair, and a [facility] Bed. This order was faxed back to [facility] at f# [phone]  A fax confirmation was received.

## 2023-09-21 NOTE — TELEPHONE ENCOUNTER
We received another request from [facility] requesting more information (height and weight). This was noted on the fax and sent back to F# [phone]. A fax confirmation was received.

## 2023-09-22 ENCOUNTER — TELEPHONE (OUTPATIENT)
Dept: INTERNAL MEDICINE CLINIC | Facility: CLINIC | Age: 68
End: 2023-09-22

## 2023-09-22 VITALS
HEART RATE: 68 BPM | TEMPERATURE: 98.1 F | SYSTOLIC BLOOD PRESSURE: 138 MMHG | DIASTOLIC BLOOD PRESSURE: 82 MMHG | OXYGEN SATURATION: 95 % | RESPIRATION RATE: 18 BRPM

## 2023-09-22 DIAGNOSIS — L97.421 SKIN ULCER OF LEFT HEEL, LIMITED TO BREAKDOWN OF SKIN (HCC): Primary | ICD-10-CM

## 2023-09-22 NOTE — TELEPHONE ENCOUNTER
Nicole called to request a new order be sent for the wound clinic due to the wound on his left outer ankle.

## 2023-09-25 ENCOUNTER — TELEPHONE (OUTPATIENT)
Dept: INTERNAL MEDICINE CLINIC | Facility: CLINIC | Age: 68
End: 2023-09-25

## 2023-09-25 ENCOUNTER — HOME CARE VISIT (OUTPATIENT)
Dept: SCHEDULING | Facility: HOME HEALTH | Age: 68
End: 2023-09-25

## 2023-09-25 VITALS
SYSTOLIC BLOOD PRESSURE: 120 MMHG | RESPIRATION RATE: 18 BRPM | DIASTOLIC BLOOD PRESSURE: 68 MMHG | HEART RATE: 70 BPM | TEMPERATURE: 98.5 F | OXYGEN SATURATION: 96 %

## 2023-09-25 VITALS
OXYGEN SATURATION: 99 % | SYSTOLIC BLOOD PRESSURE: 125 MMHG | TEMPERATURE: 97.7 F | DIASTOLIC BLOOD PRESSURE: 83 MMHG | HEART RATE: 85 BPM | RESPIRATION RATE: 16 BRPM

## 2023-09-25 DIAGNOSIS — Z79.4 TYPE 2 DIABETES MELLITUS WITH DIABETIC NEUROPATHY, WITH LONG-TERM CURRENT USE OF INSULIN (HCC): Primary | ICD-10-CM

## 2023-09-25 DIAGNOSIS — R53.81 DEBILITY: ICD-10-CM

## 2023-09-25 DIAGNOSIS — E11.40 TYPE 2 DIABETES MELLITUS WITH DIABETIC NEUROPATHY, WITH LONG-TERM CURRENT USE OF INSULIN (HCC): Primary | ICD-10-CM

## 2023-09-25 PROCEDURE — G0299 HHS/HOSPICE OF RN EA 15 MIN: HCPCS

## 2023-09-25 PROCEDURE — G0158 HHC OT ASSISTANT EA 15: HCPCS

## 2023-09-25 RX ORDER — BLOOD-GLUCOSE METER
1 KIT MISCELLANEOUS DAILY
Qty: 1 KIT | Refills: 0 | Status: SHIPPED | OUTPATIENT
Start: 2023-09-25

## 2023-09-25 RX ORDER — LANCETS 30 GAUGE
EACH MISCELLANEOUS
Qty: 100 EACH | Refills: 5 | Status: SHIPPED | OUTPATIENT
Start: 2023-09-25

## 2023-09-25 RX ORDER — GLUCOSAMINE HCL/CHONDROITIN SU 500-400 MG
CAPSULE ORAL
Qty: 100 STRIP | Refills: 5 | Status: SHIPPED | OUTPATIENT
Start: 2023-09-25

## 2023-09-25 ASSESSMENT — ENCOUNTER SYMPTOMS
PAIN LOCATION - PAIN QUALITY: PINS AND NEEDLES
PAIN LOCATION - PAIN QUALITY: ACHE
BOWEL INCONTINENCE: 1

## 2023-09-25 NOTE — TELEPHONE ENCOUNTER
Pharmacy called to get clarification of directions on test strips. Per Kash Deleon he can test 4 times a day and up to 6 times a day if needed.

## 2023-09-25 NOTE — PROGRESS NOTES
Received fax from [facility] requesting One Touch Delica brand. Note from [facility]: \"we do not carry evencare brand\".

## 2023-09-25 NOTE — TELEPHONE ENCOUNTER
Attempted to call [phone]. It says number non in service. Reordered home health  That includes SW, Speech, Skilled nursing,  Home health aide.

## 2023-09-25 NOTE — TELEPHONE ENCOUNTER
Luiza Salgado from [address] [place] [address] id requesting verbal orders for the patient to get Speech therapy,   evaluation and a Home Health Assessment.

## 2023-09-27 ENCOUNTER — HOME CARE VISIT (OUTPATIENT)
Dept: SCHEDULING | Facility: HOME HEALTH | Age: 68
End: 2023-09-27

## 2023-09-27 ENCOUNTER — HOME CARE VISIT (OUTPATIENT)
Dept: SCHEDULING | Facility: HOME HEALTH | Age: 68
End: 2023-09-27
Payer: MEDICARE

## 2023-09-27 VITALS
TEMPERATURE: 98.9 F | OXYGEN SATURATION: 97 % | HEART RATE: 69 BPM | RESPIRATION RATE: 18 BRPM | DIASTOLIC BLOOD PRESSURE: 64 MMHG | SYSTOLIC BLOOD PRESSURE: 102 MMHG

## 2023-09-27 VITALS
SYSTOLIC BLOOD PRESSURE: 130 MMHG | TEMPERATURE: 98.2 F | RESPIRATION RATE: 19 BRPM | HEART RATE: 71 BPM | DIASTOLIC BLOOD PRESSURE: 78 MMHG

## 2023-09-27 VITALS
OXYGEN SATURATION: 98 % | TEMPERATURE: 97.7 F | SYSTOLIC BLOOD PRESSURE: 124 MMHG | HEART RATE: 69 BPM | DIASTOLIC BLOOD PRESSURE: 89 MMHG | RESPIRATION RATE: 17 BRPM

## 2023-09-27 PROCEDURE — G0155 HHCP-SVS OF CSW,EA 15 MIN: HCPCS

## 2023-09-27 PROCEDURE — G0158 HHC OT ASSISTANT EA 15: HCPCS

## 2023-09-27 PROCEDURE — G0299 HHS/HOSPICE OF RN EA 15 MIN: HCPCS

## 2023-09-27 PROCEDURE — G0156 HHCP-SVS OF AIDE,EA 15 MIN: HCPCS

## 2023-09-27 ASSESSMENT — ENCOUNTER SYMPTOMS
BOWEL INCONTINENCE: 1
PAIN LOCATION - PAIN QUALITY: BURNING PAIN

## 2023-09-28 ENCOUNTER — HOME CARE VISIT (OUTPATIENT)
Dept: HOME HEALTH SERVICES | Facility: HOME HEALTH | Age: 68
End: 2023-09-28

## 2023-09-28 ENCOUNTER — HOME CARE VISIT (OUTPATIENT)
Dept: SCHEDULING | Facility: HOME HEALTH | Age: 68
End: 2023-09-28

## 2023-09-28 VITALS
OXYGEN SATURATION: 100 % | HEART RATE: 64 BPM | DIASTOLIC BLOOD PRESSURE: 66 MMHG | SYSTOLIC BLOOD PRESSURE: 118 MMHG | TEMPERATURE: 97.3 F | RESPIRATION RATE: 16 BRPM

## 2023-09-28 DIAGNOSIS — Z79.4 TYPE 2 DIABETES MELLITUS WITH DIABETIC NEUROPATHY, WITH LONG-TERM CURRENT USE OF INSULIN (HCC): Primary | ICD-10-CM

## 2023-09-28 DIAGNOSIS — E11.40 TYPE 2 DIABETES MELLITUS WITH DIABETIC NEUROPATHY, WITH LONG-TERM CURRENT USE OF INSULIN (HCC): Primary | ICD-10-CM

## 2023-09-28 PROCEDURE — G0153 HHCP-SVS OF S/L PATH,EA 15MN: HCPCS

## 2023-09-28 RX ORDER — PROCHLORPERAZINE 25 MG/1
1 SUPPOSITORY RECTAL
Qty: 3 EACH | Refills: 4 | Status: SHIPPED | OUTPATIENT
Start: 2023-09-28

## 2023-09-28 RX ORDER — PROCHLORPERAZINE 25 MG/1
1 SUPPOSITORY RECTAL ONCE
Qty: 1 EACH | Refills: 0 | Status: SHIPPED | OUTPATIENT
Start: 2023-09-28 | End: 2023-09-28

## 2023-09-28 RX ORDER — PROCHLORPERAZINE 25 MG/1
1 SUPPOSITORY RECTAL
Qty: 9 EACH | Refills: 5 | Status: SHIPPED | OUTPATIENT
Start: 2023-09-28

## 2023-09-28 ASSESSMENT — ENCOUNTER SYMPTOMS: PAIN LOCATION - PAIN QUALITY: ACHY

## 2023-09-29 ENCOUNTER — HOME CARE VISIT (OUTPATIENT)
Dept: SCHEDULING | Facility: HOME HEALTH | Age: 68
End: 2023-09-29

## 2023-09-29 ENCOUNTER — TELEPHONE (OUTPATIENT)
Dept: INTERNAL MEDICINE CLINIC | Facility: CLINIC | Age: 68
End: 2023-09-29

## 2023-09-29 VITALS
OXYGEN SATURATION: 94 % | SYSTOLIC BLOOD PRESSURE: 118 MMHG | TEMPERATURE: 98.5 F | HEART RATE: 68 BPM | DIASTOLIC BLOOD PRESSURE: 64 MMHG | RESPIRATION RATE: 19 BRPM

## 2023-09-29 VITALS
RESPIRATION RATE: 20 BRPM | TEMPERATURE: 98.1 F | SYSTOLIC BLOOD PRESSURE: 118 MMHG | OXYGEN SATURATION: 94 % | HEART RATE: 64 BPM | DIASTOLIC BLOOD PRESSURE: 64 MMHG

## 2023-09-29 PROCEDURE — G0299 HHS/HOSPICE OF RN EA 15 MIN: HCPCS

## 2023-09-29 PROCEDURE — G0156 HHCP-SVS OF AIDE,EA 15 MIN: HCPCS

## 2023-09-29 NOTE — TELEPHONE ENCOUNTER
We received a PA Request from CoverMyMeds for the pts Dexcom G6  and Transmitter.   These were filled out via CoverMyMeds using Key Codes:  : Kennedi España  We are awaiting a determination

## 2023-10-02 ENCOUNTER — HOME CARE VISIT (OUTPATIENT)
Dept: SCHEDULING | Facility: HOME HEALTH | Age: 68
End: 2023-10-02
Payer: MEDICARE

## 2023-10-02 VITALS
TEMPERATURE: 98.8 F | OXYGEN SATURATION: 96 % | SYSTOLIC BLOOD PRESSURE: 108 MMHG | HEART RATE: 81 BPM | RESPIRATION RATE: 18 BRPM | DIASTOLIC BLOOD PRESSURE: 60 MMHG

## 2023-10-02 VITALS
RESPIRATION RATE: 19 BRPM | HEART RATE: 71 BPM | SYSTOLIC BLOOD PRESSURE: 124 MMHG | DIASTOLIC BLOOD PRESSURE: 66 MMHG | TEMPERATURE: 98.7 F

## 2023-10-02 VITALS
TEMPERATURE: 97.1 F | SYSTOLIC BLOOD PRESSURE: 134 MMHG | OXYGEN SATURATION: 100 % | HEART RATE: 74 BPM | DIASTOLIC BLOOD PRESSURE: 78 MMHG | RESPIRATION RATE: 16 BRPM

## 2023-10-02 VITALS
DIASTOLIC BLOOD PRESSURE: 78 MMHG | OXYGEN SATURATION: 99 % | SYSTOLIC BLOOD PRESSURE: 130 MMHG | TEMPERATURE: 97.1 F | HEART RATE: 74 BPM | RESPIRATION RATE: 16 BRPM

## 2023-10-02 DIAGNOSIS — Z79.4 TYPE 2 DIABETES MELLITUS WITH DIABETIC NEUROPATHY, WITH LONG-TERM CURRENT USE OF INSULIN (HCC): Primary | ICD-10-CM

## 2023-10-02 DIAGNOSIS — E11.40 TYPE 2 DIABETES MELLITUS WITH DIABETIC NEUROPATHY, WITH LONG-TERM CURRENT USE OF INSULIN (HCC): Primary | ICD-10-CM

## 2023-10-02 PROCEDURE — G0156 HHCP-SVS OF AIDE,EA 15 MIN: HCPCS

## 2023-10-02 PROCEDURE — G0153 HHCP-SVS OF S/L PATH,EA 15MN: HCPCS

## 2023-10-02 PROCEDURE — G0299 HHS/HOSPICE OF RN EA 15 MIN: HCPCS

## 2023-10-02 PROCEDURE — G0158 HHC OT ASSISTANT EA 15: HCPCS

## 2023-10-02 ASSESSMENT — ENCOUNTER SYMPTOMS
PAIN LOCATION - PAIN QUALITY: PINS AND NEEDLES
PAIN LOCATION - PAIN QUALITY: ACHY

## 2023-10-02 NOTE — TELEPHONE ENCOUNTER
We received a denial letter from [employer] for the pts Dexcom G6 kit and supplies. Bryant Anne was informed and the Rx was changed to the preferred meter [facility] 2). I called and notified the pts daughter, Elaine Cross (on [place]), of the change. She verbalized understanding and was agreeable.

## 2023-10-04 ENCOUNTER — HOME CARE VISIT (OUTPATIENT)
Dept: SCHEDULING | Facility: HOME HEALTH | Age: 68
End: 2023-10-04
Payer: MEDICARE

## 2023-10-04 VITALS
SYSTOLIC BLOOD PRESSURE: 125 MMHG | RESPIRATION RATE: 19 BRPM | HEART RATE: 77 BPM | TEMPERATURE: 98.6 F | DIASTOLIC BLOOD PRESSURE: 66 MMHG

## 2023-10-04 VITALS
SYSTOLIC BLOOD PRESSURE: 126 MMHG | TEMPERATURE: 97.7 F | OXYGEN SATURATION: 98 % | HEART RATE: 80 BPM | DIASTOLIC BLOOD PRESSURE: 65 MMHG | RESPIRATION RATE: 16 BRPM

## 2023-10-04 VITALS
OXYGEN SATURATION: 96 % | RESPIRATION RATE: 18 BRPM | HEART RATE: 75 BPM | TEMPERATURE: 98.1 F | DIASTOLIC BLOOD PRESSURE: 70 MMHG | SYSTOLIC BLOOD PRESSURE: 122 MMHG

## 2023-10-04 PROCEDURE — G0156 HHCP-SVS OF AIDE,EA 15 MIN: HCPCS

## 2023-10-04 PROCEDURE — G0158 HHC OT ASSISTANT EA 15: HCPCS

## 2023-10-04 PROCEDURE — G0299 HHS/HOSPICE OF RN EA 15 MIN: HCPCS

## 2023-10-05 ENCOUNTER — HOME CARE VISIT (OUTPATIENT)
Dept: SCHEDULING | Facility: HOME HEALTH | Age: 68
End: 2023-10-05
Payer: MEDICARE

## 2023-10-05 VITALS
DIASTOLIC BLOOD PRESSURE: 78 MMHG | HEART RATE: 64 BPM | RESPIRATION RATE: 16 BRPM | TEMPERATURE: 97.3 F | OXYGEN SATURATION: 100 % | SYSTOLIC BLOOD PRESSURE: 124 MMHG

## 2023-10-05 PROCEDURE — G0153 HHCP-SVS OF S/L PATH,EA 15MN: HCPCS

## 2023-10-06 ENCOUNTER — HOME CARE VISIT (OUTPATIENT)
Dept: SCHEDULING | Facility: HOME HEALTH | Age: 68
End: 2023-10-06
Payer: MEDICARE

## 2023-10-06 VITALS
RESPIRATION RATE: 17 BRPM | SYSTOLIC BLOOD PRESSURE: 123 MMHG | DIASTOLIC BLOOD PRESSURE: 67 MMHG | TEMPERATURE: 98.8 F | HEART RATE: 71 BPM

## 2023-10-06 PROCEDURE — G0299 HHS/HOSPICE OF RN EA 15 MIN: HCPCS

## 2023-10-06 PROCEDURE — G0156 HHCP-SVS OF AIDE,EA 15 MIN: HCPCS

## 2023-10-07 VITALS
TEMPERATURE: 97.8 F | HEART RATE: 67 BPM | OXYGEN SATURATION: 96 % | DIASTOLIC BLOOD PRESSURE: 68 MMHG | SYSTOLIC BLOOD PRESSURE: 128 MMHG | RESPIRATION RATE: 18 BRPM

## 2023-10-09 ENCOUNTER — HOME CARE VISIT (OUTPATIENT)
Dept: SCHEDULING | Facility: HOME HEALTH | Age: 68
End: 2023-10-09
Payer: MEDICARE

## 2023-10-09 ENCOUNTER — TELEPHONE (OUTPATIENT)
Dept: INTERNAL MEDICINE CLINIC | Facility: CLINIC | Age: 68
End: 2023-10-09

## 2023-10-09 VITALS
TEMPERATURE: 97.3 F | HEART RATE: 64 BPM | RESPIRATION RATE: 16 BRPM | DIASTOLIC BLOOD PRESSURE: 84 MMHG | SYSTOLIC BLOOD PRESSURE: 136 MMHG | OXYGEN SATURATION: 100 %

## 2023-10-09 VITALS
TEMPERATURE: 97.7 F | SYSTOLIC BLOOD PRESSURE: 121 MMHG | RESPIRATION RATE: 17 BRPM | DIASTOLIC BLOOD PRESSURE: 69 MMHG | HEART RATE: 76 BPM

## 2023-10-09 VITALS
RESPIRATION RATE: 22 BRPM | TEMPERATURE: 97.8 F | DIASTOLIC BLOOD PRESSURE: 98 MMHG | HEART RATE: 64 BPM | SYSTOLIC BLOOD PRESSURE: 160 MMHG | OXYGEN SATURATION: 99 %

## 2023-10-09 PROCEDURE — G0299 HHS/HOSPICE OF RN EA 15 MIN: HCPCS

## 2023-10-09 PROCEDURE — G0153 HHCP-SVS OF S/L PATH,EA 15MN: HCPCS

## 2023-10-09 PROCEDURE — G0156 HHCP-SVS OF AIDE,EA 15 MIN: HCPCS

## 2023-10-09 ASSESSMENT — ENCOUNTER SYMPTOMS: PAIN LOCATION - PAIN QUALITY: ACHY

## 2023-10-09 NOTE — TELEPHONE ENCOUNTER
Patient was seen today, not taking Ana Meeter, somehow needle was bent, but he us taking the Novolog 4 units tid - he was instructed to call with BS readings above 240. BP was evaluated - 160/98 today without any symptoms.

## 2023-10-10 ENCOUNTER — TELEPHONE (OUTPATIENT)
Dept: INTERNAL MEDICINE CLINIC | Facility: CLINIC | Age: 68
End: 2023-10-10

## 2023-10-10 ENCOUNTER — HOME CARE VISIT (OUTPATIENT)
Dept: SCHEDULING | Facility: HOME HEALTH | Age: 68
End: 2023-10-10
Payer: MEDICARE

## 2023-10-10 PROCEDURE — G0152 HHCP-SERV OF OT,EA 15 MIN: HCPCS

## 2023-10-10 ASSESSMENT — ENCOUNTER SYMPTOMS
COUGH: 1
COUGH CHARACTERISTICS: MOIST
STOOL DESCRIPTION: SOFT FORMED

## 2023-10-11 ENCOUNTER — HOME CARE VISIT (OUTPATIENT)
Dept: SCHEDULING | Facility: HOME HEALTH | Age: 68
End: 2023-10-11
Payer: MEDICARE

## 2023-10-11 VITALS
DIASTOLIC BLOOD PRESSURE: 62 MMHG | SYSTOLIC BLOOD PRESSURE: 131 MMHG | OXYGEN SATURATION: 97 % | RESPIRATION RATE: 18 BRPM | TEMPERATURE: 98.2 F | HEART RATE: 73 BPM

## 2023-10-11 VITALS
TEMPERATURE: 98 F | DIASTOLIC BLOOD PRESSURE: 71 MMHG | HEART RATE: 70 BPM | RESPIRATION RATE: 19 BRPM | SYSTOLIC BLOOD PRESSURE: 133 MMHG

## 2023-10-11 VITALS
SYSTOLIC BLOOD PRESSURE: 122 MMHG | DIASTOLIC BLOOD PRESSURE: 64 MMHG | HEART RATE: 56 BPM | RESPIRATION RATE: 16 BRPM | OXYGEN SATURATION: 98 % | TEMPERATURE: 96.2 F

## 2023-10-11 VITALS
OXYGEN SATURATION: 98 % | HEART RATE: 70 BPM | DIASTOLIC BLOOD PRESSURE: 84 MMHG | RESPIRATION RATE: 16 BRPM | SYSTOLIC BLOOD PRESSURE: 128 MMHG | TEMPERATURE: 97.5 F

## 2023-10-11 PROCEDURE — G0156 HHCP-SVS OF AIDE,EA 15 MIN: HCPCS

## 2023-10-11 PROCEDURE — G0299 HHS/HOSPICE OF RN EA 15 MIN: HCPCS

## 2023-10-11 PROCEDURE — G0153 HHCP-SVS OF S/L PATH,EA 15MN: HCPCS

## 2023-10-11 ASSESSMENT — ENCOUNTER SYMPTOMS
CONSTIPATION: 1
CONSTIPATION: 1
PAIN LOCATION - PAIN QUALITY: ACHEY/SORE
PAIN LOCATION - PAIN QUALITY: ACHY

## 2023-10-11 NOTE — CASE COMMUNICATION
Noted message by OT below. Bry Beltran, OT  [place], Hezekiah Landau, APRN - CNP; Monalisa Carballo, RN; Caprice Ornelas, RN  Patient seen by OT for strengthening of upper body, transfer training, ADL retraining and energy conservation. Patient demonstrates ability to perform HEP, but has been non-compliant with completing HEP. Patient states he does not have a band, so did not do them. OT instructed him to perform the exercises with weights in the home daily and BID when able with verbal understanding. . Patient reports no falls during this episode. Patient is I to min assist with ADL's and supervision mod I for transfers. Patient not using shower at this time per choice. He has a tub transfer bench and states he has transfered to and from it, but declines today stating he is not going to use. it. Patient has reached max benefit for OT due to non-compliance and some memory issues. Daughter is present much of the time to manage care. Patient is d/c'd from OT at this time.

## 2023-10-13 ENCOUNTER — HOME CARE VISIT (OUTPATIENT)
Dept: SCHEDULING | Facility: HOME HEALTH | Age: 68
End: 2023-10-13
Payer: MEDICARE

## 2023-10-13 PROCEDURE — G0299 HHS/HOSPICE OF RN EA 15 MIN: HCPCS

## 2023-10-16 ENCOUNTER — HOME CARE VISIT (OUTPATIENT)
Dept: SCHEDULING | Facility: HOME HEALTH | Age: 68
End: 2023-10-16
Payer: MEDICARE

## 2023-10-16 VITALS
OXYGEN SATURATION: 99 % | HEART RATE: 80 BPM | DIASTOLIC BLOOD PRESSURE: 78 MMHG | SYSTOLIC BLOOD PRESSURE: 128 MMHG | TEMPERATURE: 97 F | RESPIRATION RATE: 18 BRPM

## 2023-10-16 PROCEDURE — G0299 HHS/HOSPICE OF RN EA 15 MIN: HCPCS

## 2023-10-16 PROCEDURE — G0153 HHCP-SVS OF S/L PATH,EA 15MN: HCPCS

## 2023-10-16 ASSESSMENT — ENCOUNTER SYMPTOMS: PAIN LOCATION - PAIN QUALITY: ACHY

## 2023-10-16 NOTE — CASE COMMUNICATION
Pt refused his HHA visit today 10/16  for the 2nd time. Pt will not come to the door. I was outside for 30 minutes knocking and telling him who I was and that I was coming to do his bath. I called his daughter, and she said he will not be opening the door for me because he stated it was too early for him. Prior to scheduling visits with her, she states it is ok, but when I get there he doesnt answer. I will visit pt in the afternoon on  Wednesday.

## 2023-10-17 VITALS
TEMPERATURE: 98.2 F | OXYGEN SATURATION: 95 % | HEART RATE: 74 BPM | SYSTOLIC BLOOD PRESSURE: 160 MMHG | DIASTOLIC BLOOD PRESSURE: 90 MMHG | RESPIRATION RATE: 18 BRPM

## 2023-10-18 ENCOUNTER — HOME CARE VISIT (OUTPATIENT)
Dept: HOME HEALTH SERVICES | Facility: HOME HEALTH | Age: 68
End: 2023-10-18
Payer: MEDICARE

## 2023-10-18 ENCOUNTER — HOME CARE VISIT (OUTPATIENT)
Dept: SCHEDULING | Facility: HOME HEALTH | Age: 68
End: 2023-10-18
Payer: MEDICARE

## 2023-10-18 VITALS
RESPIRATION RATE: 19 BRPM | SYSTOLIC BLOOD PRESSURE: 130 MMHG | DIASTOLIC BLOOD PRESSURE: 71 MMHG | HEART RATE: 71 BPM | TEMPERATURE: 98.3 F

## 2023-10-18 VITALS
RESPIRATION RATE: 18 BRPM | SYSTOLIC BLOOD PRESSURE: 140 MMHG | OXYGEN SATURATION: 98 % | DIASTOLIC BLOOD PRESSURE: 90 MMHG | HEART RATE: 74 BPM | TEMPERATURE: 97.9 F

## 2023-10-18 PROCEDURE — G0156 HHCP-SVS OF AIDE,EA 15 MIN: HCPCS

## 2023-10-18 PROCEDURE — G0299 HHS/HOSPICE OF RN EA 15 MIN: HCPCS

## 2023-10-19 ENCOUNTER — TELEPHONE (OUTPATIENT)
Dept: INTERNAL MEDICINE CLINIC | Facility: CLINIC | Age: 68
End: 2023-10-19

## 2023-10-19 VITALS
SYSTOLIC BLOOD PRESSURE: 140 MMHG | TEMPERATURE: 98.3 F | RESPIRATION RATE: 18 BRPM | HEART RATE: 86 BPM | OXYGEN SATURATION: 99 % | DIASTOLIC BLOOD PRESSURE: 80 MMHG

## 2023-10-19 NOTE — TELEPHONE ENCOUNTER
Red Sawyer NP asked that I fax DME orders to [facility].   I located the fax number for [facility] in [facility] (F# [phone])  The order and ins info was printed out and faxed to them. A fax confirmation was received. I attempted to complete a release for the insurance card, but both Jinn ([facility] mgr) and I, were unable to find the tab that it is located under.   This is information the DME company will need to fulfill the request.

## 2023-10-20 ENCOUNTER — HOME CARE VISIT (OUTPATIENT)
Dept: SCHEDULING | Facility: HOME HEALTH | Age: 68
End: 2023-10-20
Payer: MEDICARE

## 2023-10-20 VITALS
TEMPERATURE: 98.9 F | RESPIRATION RATE: 17 BRPM | DIASTOLIC BLOOD PRESSURE: 73 MMHG | SYSTOLIC BLOOD PRESSURE: 130 MMHG | HEART RATE: 75 BPM

## 2023-10-20 PROCEDURE — G0299 HHS/HOSPICE OF RN EA 15 MIN: HCPCS

## 2023-10-20 PROCEDURE — G0156 HHCP-SVS OF AIDE,EA 15 MIN: HCPCS

## 2023-10-21 ENCOUNTER — HOME CARE VISIT (OUTPATIENT)
Dept: HOME HEALTH SERVICES | Facility: HOME HEALTH | Age: 68
End: 2023-10-21
Payer: MEDICARE

## 2023-10-22 NOTE — PROGRESS NOTES
08/04/2022    LABVLDL 23 12/05/2019     Lab Results   Component Value Date    CHOLHDLRATIO 5.0 10/20/2022    CHOLHDLRATIO 5.8 08/04/2022        Lab Results   Component Value Date/Time     10/20/2022 03:16 PM     08/04/2022 02:54 PM     12/05/2019 01:39 PM    K 4.4 10/20/2022 03:16 PM    K 4.3 08/04/2022 02:54 PM    K 4.7 12/05/2019 01:39 PM     10/20/2022 03:16 PM     08/04/2022 02:54 PM     12/05/2019 01:39 PM    CO2 24 10/20/2022 03:16 PM    CO2 24 08/04/2022 02:54 PM    CO2 22 12/05/2019 01:39 PM    BUN 33 10/20/2022 03:16 PM    BUN 27 08/04/2022 02:54 PM    BUN 30 12/05/2019 01:39 PM    CREATININE 1.86 10/20/2022 03:16 PM    CREATININE 1.90 08/04/2022 02:54 PM    CREATININE 1.18 12/05/2019 01:39 PM    GLUCOSE 429 10/20/2022 03:16 PM    GLUCOSE 245 08/04/2022 02:54 PM    GLUCOSE 99 12/05/2019 01:39 PM    CALCIUM 9.3 10/20/2022 03:16 PM    CALCIUM 9.1 08/04/2022 02:54 PM    CALCIUM 9.7 12/05/2019 01:39 PM         Lab Results   Component Value Date    ALT 30 10/20/2022     (H) 08/04/2022    ALT 8 12/05/2019    AST 15 10/20/2022    AST 40 (H) 08/04/2022    AST 6 12/05/2019        Assessment/Plan:   1. CAD in native artery  - Continue with baby aspirin daily and Lipitor    2. Healthcare maintenance  - Refer to internal medicine    3. Heart failure with mid-range ejection fraction (HCC)  - Start p.o. Lasix as needed  - Instructed the patient to take a dose of Lasix for weight gain of 2 lbs in two days or 5 lbs in one week and return to as needed dosing once the weight returns to baseline  - Obtain an ischemic evaluation with an [facility] in the setting of a newly reduced EF and a history of CAD with multiple risk factors for obstructive CAD with a recent troponin elevation noted during hospitalization  - Obtain a CBC and a BMP  - Start Jardiance 10 mg daily    4.  Asymptomatic stenosis of left carotid artery  - Endovascular or surgery intervention are neither grade I nor IIa

## 2023-10-23 ENCOUNTER — TELEPHONE (OUTPATIENT)
Dept: INTERNAL MEDICINE CLINIC | Facility: CLINIC | Age: 68
End: 2023-10-23

## 2023-10-23 ENCOUNTER — HOME CARE VISIT (OUTPATIENT)
Dept: SCHEDULING | Facility: HOME HEALTH | Age: 68
End: 2023-10-23
Payer: MEDICARE

## 2023-10-23 ENCOUNTER — TELEPHONE (OUTPATIENT)
Age: 68
End: 2023-10-23

## 2023-10-23 ENCOUNTER — INITIAL CONSULT (OUTPATIENT)
Age: 68
End: 2023-10-23
Payer: MEDICARE

## 2023-10-23 VITALS
WEIGHT: 188 LBS | HEIGHT: 68 IN | SYSTOLIC BLOOD PRESSURE: 120 MMHG | DIASTOLIC BLOOD PRESSURE: 62 MMHG | BODY MASS INDEX: 28.49 KG/M2 | HEART RATE: 79 BPM

## 2023-10-23 DIAGNOSIS — I65.22 ASYMPTOMATIC STENOSIS OF LEFT CAROTID ARTERY: ICD-10-CM

## 2023-10-23 DIAGNOSIS — I10 HYPERTENSION, UNSPECIFIED TYPE: ICD-10-CM

## 2023-10-23 DIAGNOSIS — I25.10 CAD IN NATIVE ARTERY: Primary | ICD-10-CM

## 2023-10-23 DIAGNOSIS — I50.22 HEART FAILURE WITH MID-RANGE EJECTION FRACTION (HCC): ICD-10-CM

## 2023-10-23 DIAGNOSIS — E78.5 HYPERLIPIDEMIA, UNSPECIFIED HYPERLIPIDEMIA TYPE: ICD-10-CM

## 2023-10-23 DIAGNOSIS — Z00.00 HEALTHCARE MAINTENANCE: ICD-10-CM

## 2023-10-23 PROCEDURE — 99204 OFFICE O/P NEW MOD 45 MIN: CPT | Performed by: INTERNAL MEDICINE

## 2023-10-23 PROCEDURE — 93000 ELECTROCARDIOGRAM COMPLETE: CPT | Performed by: INTERNAL MEDICINE

## 2023-10-23 PROCEDURE — 3078F DIAST BP <80 MM HG: CPT | Performed by: INTERNAL MEDICINE

## 2023-10-23 PROCEDURE — 3074F SYST BP LT 130 MM HG: CPT | Performed by: INTERNAL MEDICINE

## 2023-10-23 PROCEDURE — 1123F ACP DISCUSS/DSCN MKR DOCD: CPT | Performed by: INTERNAL MEDICINE

## 2023-10-23 RX ORDER — FUROSEMIDE 40 MG/1
40 TABLET ORAL DAILY PRN
Qty: 60 TABLET | Refills: 3 | Status: SHIPPED | OUTPATIENT
Start: 2023-10-23

## 2023-10-23 NOTE — TELEPHONE ENCOUNTER
----- Message from Burke Sargent MD sent at 10/23/2023 11:56 AM EDT -----  The patient did not bring his medications or his list with him to clinic today. Please confirm his cardiac medications with his daughter when she is at home since, for example, he has 2 beta-blockers on his list, metoprolol and Coreg.

## 2023-10-23 NOTE — TELEPHONE ENCOUNTER
We previously faxed DME orders to [facility]  for a 3-in-1 bedside commode and a light weight transport wheelchair. However, we received a notification from them that they do not provide the listed items. Nii Alan NP asked that I contact [address] [place] [address] (ph# [phone]). I called their facility, but they stated they do not accept the pts insurance. So, I called and LMOM for the pts daughter/caregiver, Luisa Diaz (on PA- ph# [phone]) asking that they contact the insurance to find out which DME company they prefer and call us back.

## 2023-10-24 ENCOUNTER — TELEPHONE (OUTPATIENT)
Age: 68
End: 2023-10-24

## 2023-10-24 VITALS
TEMPERATURE: 97.9 F | DIASTOLIC BLOOD PRESSURE: 60 MMHG | OXYGEN SATURATION: 98 % | SYSTOLIC BLOOD PRESSURE: 130 MMHG | RESPIRATION RATE: 18 BRPM | HEART RATE: 80 BPM

## 2023-10-24 VITALS
DIASTOLIC BLOOD PRESSURE: 89 MMHG | OXYGEN SATURATION: 98 % | HEART RATE: 74 BPM | RESPIRATION RATE: 18 BRPM | SYSTOLIC BLOOD PRESSURE: 140 MMHG | TEMPERATURE: 97.9 F

## 2023-10-24 NOTE — TELEPHONE ENCOUNTER
Pt daughter returned call and stated she may be reached at [phone].  She said to give her a call around 4

## 2023-10-24 NOTE — TELEPHONE ENCOUNTER
I called the pts daughter/caregiver, Checo Salinas (on PA, again.   She stated the insurance gave her 3 DME companies:   [facility] (ph# [phone])  [address] [place] [address] (oh# [phone])  [facility] (ph# [phone])

## 2023-10-25 ENCOUNTER — HOME CARE VISIT (OUTPATIENT)
Dept: HOME HEALTH SERVICES | Facility: HOME HEALTH | Age: 68
End: 2023-10-25
Payer: MEDICARE

## 2023-10-25 ENCOUNTER — HOME CARE VISIT (OUTPATIENT)
Dept: SCHEDULING | Facility: HOME HEALTH | Age: 68
End: 2023-10-25
Payer: MEDICARE

## 2023-10-25 VITALS
HEART RATE: 77 BPM | SYSTOLIC BLOOD PRESSURE: 133 MMHG | RESPIRATION RATE: 20 BRPM | TEMPERATURE: 97.9 F | DIASTOLIC BLOOD PRESSURE: 77 MMHG

## 2023-10-25 PROCEDURE — G0156 HHCP-SVS OF AIDE,EA 15 MIN: HCPCS

## 2023-10-25 PROCEDURE — G0299 HHS/HOSPICE OF RN EA 15 MIN: HCPCS

## 2023-10-26 ENCOUNTER — HOME CARE VISIT (OUTPATIENT)
Dept: HOME HEALTH SERVICES | Facility: HOME HEALTH | Age: 68
End: 2023-10-26
Payer: MEDICARE

## 2023-10-26 VITALS
RESPIRATION RATE: 18 BRPM | SYSTOLIC BLOOD PRESSURE: 140 MMHG | OXYGEN SATURATION: 98 % | TEMPERATURE: 98.2 F | HEART RATE: 77 BPM | DIASTOLIC BLOOD PRESSURE: 70 MMHG

## 2023-10-26 NOTE — TELEPHONE ENCOUNTER
Pt canceled appt for today for Medicare Wellness 10/26/2023. Pt transferring care to Sabina Caceres NP at [facility] 1/2024.

## 2023-10-27 ENCOUNTER — HOME CARE VISIT (OUTPATIENT)
Dept: SCHEDULING | Facility: HOME HEALTH | Age: 68
End: 2023-10-27
Payer: MEDICARE

## 2023-10-27 VITALS
RESPIRATION RATE: 19 BRPM | SYSTOLIC BLOOD PRESSURE: 130 MMHG | HEART RATE: 74 BPM | TEMPERATURE: 98.1 F | DIASTOLIC BLOOD PRESSURE: 77 MMHG

## 2023-10-27 PROCEDURE — G0156 HHCP-SVS OF AIDE,EA 15 MIN: HCPCS

## 2023-10-27 PROCEDURE — G0299 HHS/HOSPICE OF RN EA 15 MIN: HCPCS

## 2023-10-29 ENCOUNTER — HOME CARE VISIT (OUTPATIENT)
Dept: HOME HEALTH SERVICES | Facility: HOME HEALTH | Age: 68
End: 2023-10-29
Payer: MEDICARE

## 2023-10-30 ENCOUNTER — HOME CARE VISIT (OUTPATIENT)
Dept: SCHEDULING | Facility: HOME HEALTH | Age: 68
End: 2023-10-30
Payer: MEDICARE

## 2023-11-02 VITALS
OXYGEN SATURATION: 94 % | SYSTOLIC BLOOD PRESSURE: 146 MMHG | TEMPERATURE: 98.4 F | HEART RATE: 78 BPM | RESPIRATION RATE: 16 BRPM | DIASTOLIC BLOOD PRESSURE: 70 MMHG

## 2023-11-02 ASSESSMENT — ENCOUNTER SYMPTOMS: PAIN LOCATION - PAIN QUALITY: ACHE

## 2023-11-22 PROBLEM — Z00.00 HEALTHCARE MAINTENANCE: Status: RESOLVED | Noted: 2023-10-23 | Resolved: 2023-11-22

## 2024-01-08 ENCOUNTER — OFFICE VISIT (OUTPATIENT)
Dept: INTERNAL MEDICINE CLINIC | Facility: CLINIC | Age: 69
End: 2024-01-08
Payer: MEDICARE

## 2024-01-08 VITALS — DIASTOLIC BLOOD PRESSURE: 74 MMHG | SYSTOLIC BLOOD PRESSURE: 122 MMHG

## 2024-01-08 DIAGNOSIS — E78.00 PURE HYPERCHOLESTEROLEMIA: ICD-10-CM

## 2024-01-08 DIAGNOSIS — Z12.5 ENCOUNTER FOR SCREENING FOR MALIGNANT NEOPLASM OF PROSTATE: ICD-10-CM

## 2024-01-08 DIAGNOSIS — I25.10 CORONARY ARTERY DISEASE INVOLVING NATIVE CORONARY ARTERY OF NATIVE HEART WITHOUT ANGINA PECTORIS: ICD-10-CM

## 2024-01-08 DIAGNOSIS — N18.32 STAGE 3B CHRONIC KIDNEY DISEASE (HCC): ICD-10-CM

## 2024-01-08 DIAGNOSIS — I10 PRIMARY HYPERTENSION: Primary | ICD-10-CM

## 2024-01-08 DIAGNOSIS — E03.9 HYPOTHYROIDISM, UNSPECIFIED TYPE: ICD-10-CM

## 2024-01-08 DIAGNOSIS — R20.2 NUMBNESS AND TINGLING: ICD-10-CM

## 2024-01-08 DIAGNOSIS — D64.9 ANEMIA, UNSPECIFIED TYPE: ICD-10-CM

## 2024-01-08 DIAGNOSIS — I10 PRIMARY HYPERTENSION: ICD-10-CM

## 2024-01-08 DIAGNOSIS — R20.0 NUMBNESS AND TINGLING: ICD-10-CM

## 2024-01-08 DIAGNOSIS — E11.59 TYPE 2 DIABETES MELLITUS WITH VASCULAR DISEASE (HCC): ICD-10-CM

## 2024-01-08 DIAGNOSIS — Z72.0 TOBACCO ABUSE: ICD-10-CM

## 2024-01-08 DIAGNOSIS — E11.40 TYPE 2 DIABETES MELLITUS WITH DIABETIC NEUROPATHY, WITHOUT LONG-TERM CURRENT USE OF INSULIN (HCC): ICD-10-CM

## 2024-01-08 DIAGNOSIS — I65.22 ASYMPTOMATIC STENOSIS OF LEFT CAROTID ARTERY: ICD-10-CM

## 2024-01-08 PROBLEM — I21.4 NSTEMI (NON-ST ELEVATED MYOCARDIAL INFARCTION) (HCC): Status: ACTIVE | Noted: 2021-01-28

## 2024-01-08 PROBLEM — R65.10 SIRS (SYSTEMIC INFLAMMATORY RESPONSE SYNDROME) (HCC): Status: ACTIVE | Noted: 2021-01-28

## 2024-01-08 PROBLEM — R06.02 SHORTNESS OF BREATH: Status: ACTIVE | Noted: 2023-04-07

## 2024-01-08 PROBLEM — S27.0XXA TRAUMATIC PNEUMOTHORAX: Status: ACTIVE | Noted: 2023-09-10

## 2024-01-08 PROBLEM — I16.0 HYPERTENSIVE URGENCY: Status: ACTIVE | Noted: 2022-06-18

## 2024-01-08 PROBLEM — R07.9 CHEST PAIN OF UNKNOWN ETIOLOGY: Status: ACTIVE | Noted: 2023-09-06

## 2024-01-08 PROBLEM — Z99.3 WHEELCHAIR DEPENDENCE: Status: ACTIVE | Noted: 2023-09-10

## 2024-01-08 PROBLEM — R94.31 PROLONGED Q-T INTERVAL ON ECG: Status: ACTIVE | Noted: 2021-01-28

## 2024-01-08 PROBLEM — S22.42XD CLOSED FRACTURE OF MULTIPLE RIBS OF LEFT SIDE WITH ROUTINE HEALING: Status: ACTIVE | Noted: 2023-09-10

## 2024-01-08 PROBLEM — I16.1 HYPERTENSIVE EMERGENCY: Status: ACTIVE | Noted: 2021-01-28

## 2024-01-08 PROBLEM — R91.8 PULMONARY NODULES: Status: ACTIVE | Noted: 2021-01-28

## 2024-01-08 PROBLEM — R06.2 WHEEZING: Status: ACTIVE | Noted: 2021-01-28

## 2024-01-08 PROBLEM — G63 POLYNEUROPATHY ASSOCIATED WITH UNDERLYING DISEASE (HCC): Status: ACTIVE | Noted: 2023-09-10

## 2024-01-08 PROBLEM — N17.9 ACUTE KIDNEY INJURY (HCC): Status: ACTIVE | Noted: 2021-01-28

## 2024-01-08 LAB
ALBUMIN SERPL-MCNC: 3.1 G/DL (ref 3.2–4.6)
ALBUMIN/GLOB SERPL: 0.8 (ref 0.4–1.6)
ALP SERPL-CCNC: 109 U/L (ref 50–136)
ALT SERPL-CCNC: 23 U/L (ref 12–65)
ANION GAP SERPL CALC-SCNC: 5 MMOL/L (ref 2–11)
AST SERPL-CCNC: 18 U/L (ref 15–37)
BASOPHILS # BLD: 0.1 K/UL (ref 0–0.2)
BASOPHILS NFR BLD: 1 % (ref 0–2)
BILIRUB SERPL-MCNC: 0.2 MG/DL (ref 0.2–1.1)
BUN SERPL-MCNC: 54 MG/DL (ref 8–23)
CALCIUM SERPL-MCNC: 9.5 MG/DL (ref 8.3–10.4)
CHLORIDE SERPL-SCNC: 109 MMOL/L (ref 103–113)
CHOLEST SERPL-MCNC: 269 MG/DL
CO2 SERPL-SCNC: 27 MMOL/L (ref 21–32)
CREAT SERPL-MCNC: 1.5 MG/DL (ref 0.8–1.5)
DIFFERENTIAL METHOD BLD: ABNORMAL
EOSINOPHIL # BLD: 0.6 K/UL (ref 0–0.8)
EOSINOPHIL NFR BLD: 5 % (ref 0.5–7.8)
ERYTHROCYTE [DISTWIDTH] IN BLOOD BY AUTOMATED COUNT: 14.7 % (ref 11.9–14.6)
GLOBULIN SER CALC-MCNC: 3.8 G/DL (ref 2.8–4.5)
GLUCOSE SERPL-MCNC: 150 MG/DL (ref 65–100)
HCT VFR BLD AUTO: 46.5 % (ref 41.1–50.3)
HDLC SERPL-MCNC: 41 MG/DL (ref 40–60)
HDLC SERPL: 6.6
HGB BLD-MCNC: 15 G/DL (ref 13.6–17.2)
IMM GRANULOCYTES # BLD AUTO: 0 K/UL (ref 0–0.5)
IMM GRANULOCYTES NFR BLD AUTO: 0 % (ref 0–5)
LDLC SERPL CALC-MCNC: 205.6 MG/DL
LYMPHOCYTES # BLD: 1.9 K/UL (ref 0.5–4.6)
LYMPHOCYTES NFR BLD: 17 % (ref 13–44)
MCH RBC QN AUTO: 28 PG (ref 26.1–32.9)
MCHC RBC AUTO-ENTMCNC: 32.3 G/DL (ref 31.4–35)
MCV RBC AUTO: 86.9 FL (ref 82–102)
MONOCYTES # BLD: 0.6 K/UL (ref 0.1–1.3)
MONOCYTES NFR BLD: 6 % (ref 4–12)
NEUTS SEG # BLD: 7.5 K/UL (ref 1.7–8.2)
NEUTS SEG NFR BLD: 70 % (ref 43–78)
NRBC # BLD: 0 K/UL (ref 0–0.2)
PLATELET # BLD AUTO: 309 K/UL (ref 150–450)
PMV BLD AUTO: 10.8 FL (ref 9.4–12.3)
POTASSIUM SERPL-SCNC: 5.1 MMOL/L (ref 3.5–5.1)
PROT SERPL-MCNC: 6.9 G/DL (ref 6.3–8.2)
PSA SERPL-MCNC: 0.4 NG/ML
RBC # BLD AUTO: 5.35 M/UL (ref 4.23–5.6)
SODIUM SERPL-SCNC: 141 MMOL/L (ref 136–146)
TRIGL SERPL-MCNC: 112 MG/DL (ref 35–150)
TSH, 3RD GENERATION: 20.9 UIU/ML (ref 0.36–3.74)
URATE SERPL-MCNC: 4.3 MG/DL (ref 2.6–6)
VIT B12 SERPL-MCNC: 759 PG/ML (ref 193–986)
VLDLC SERPL CALC-MCNC: 22.4 MG/DL (ref 6–23)
WBC # BLD AUTO: 10.7 K/UL (ref 4.3–11.1)

## 2024-01-08 PROCEDURE — 1123F ACP DISCUSS/DSCN MKR DOCD: CPT | Performed by: NURSE PRACTITIONER

## 2024-01-08 PROCEDURE — 3078F DIAST BP <80 MM HG: CPT | Performed by: NURSE PRACTITIONER

## 2024-01-08 PROCEDURE — 3051F HG A1C>EQUAL 7.0%<8.0%: CPT | Performed by: NURSE PRACTITIONER

## 2024-01-08 PROCEDURE — 99205 OFFICE O/P NEW HI 60 MIN: CPT | Performed by: NURSE PRACTITIONER

## 2024-01-08 PROCEDURE — 3074F SYST BP LT 130 MM HG: CPT | Performed by: NURSE PRACTITIONER

## 2024-01-08 RX ORDER — GABAPENTIN 100 MG/1
CAPSULE ORAL
Qty: 180 CAPSULE | Refills: 0 | Status: SHIPPED | OUTPATIENT
Start: 2024-01-08 | End: 2024-05-03

## 2024-01-08 RX ORDER — LEVOTHYROXINE SODIUM 0.2 MG/1
200 TABLET ORAL DAILY
Qty: 90 TABLET | Refills: 1 | Status: SHIPPED | OUTPATIENT
Start: 2024-01-08

## 2024-01-08 ASSESSMENT — PATIENT HEALTH QUESTIONNAIRE - PHQ9
SUM OF ALL RESPONSES TO PHQ QUESTIONS 1-9: 0
SUM OF ALL RESPONSES TO PHQ QUESTIONS 1-9: 0
1. LITTLE INTEREST OR PLEASURE IN DOING THINGS: 0
SUM OF ALL RESPONSES TO PHQ9 QUESTIONS 1 & 2: 0
2. FEELING DOWN, DEPRESSED OR HOPELESS: 0
SUM OF ALL RESPONSES TO PHQ QUESTIONS 1-9: 0
SUM OF ALL RESPONSES TO PHQ QUESTIONS 1-9: 0

## 2024-01-08 NOTE — PROGRESS NOTES
Vascular Surgery, [place]  -     [facility] - Care Coordination/Social Work - MSS[facility] Care Management  -     [facility]  12. Numbness and tingling  -     Vitamin B12; Future  -     [facility]      Return for Follow-Up new patient physical exam and follow-up 2 to 3 weeks.  lab results and schedule of future lab studies reviewed with patient  reviewed diet, exercise and weight control  very strongly urged to quit smoking to reduce cardiovascular risk  cardiovascular risk and specific lipid/LDL goals reviewed  reviewed medications and side effects in detail  specific diabetic recommendations: all medications, side effects and compliance discussed carefully, foot care discussed and Podiatry visits discussed, annual eye examinations at Ophthalmology discussed, glycohemoglobin and other lab monitoring discussed, long term diabetic complications discussed, patient urged in the strongest terms to quit smoking, and labs immediately prior to next visit  Recommend home health care  PT -- evaluate and treat, also to aid in identifying needs for a new wheelchair to aid his ability to get himself into/out of bathroom.  OT to aid modifications to allow patient to participate more in his ADLs.    Recommend referral to vascular surgery.  Wound on LLE has been present with delayed healing.  Diminished Pts bilateral with tready DPs on foot exam.  He is advised to wean his cigarettes down by decreasing one cigarette each week.  Referral to  for other needs including transportation to/from appointments, home needs.  Recommended lab panel last creatinine measured at [facility] 1.80, hemoglobin A1c 9/6/2023 was 9.2%.  mild anemia also noted.    9/25/2023 TSH  25.8 with Free T4 1.22;   7/28/2023 .000 with Free T4 ,0.11  Unable to determine dosage at above testing.    Advised daughter on skin care:    Dial soap to wash posterior neck and shoulders.  O'Keefes foot cream to excessively dry

## 2024-01-09 LAB
EST. AVERAGE GLUCOSE BLD GHB EST-MCNC: 157 MG/DL
HBA1C MFR BLD: 7.1 % (ref 4.8–5.6)

## 2024-01-10 ENCOUNTER — CARE COORDINATION (OUTPATIENT)
Dept: CARE COORDINATION | Facility: CLINIC | Age: 69
End: 2024-01-10

## 2024-01-10 PROBLEM — I16.0 HYPERTENSIVE URGENCY: Status: RESOLVED | Noted: 2022-06-18 | Resolved: 2024-01-10

## 2024-01-10 NOTE — CARE COORDINATION
Spoke briefly to the patient's daughter Marisela. She states she would have to call LARISA-MARVIN back later, she was at work. Also spoke with the Home Health SW who confirmed home health services back in October 2023.

## 2024-01-12 ENCOUNTER — CARE COORDINATION (OUTPATIENT)
Dept: CARE COORDINATION | Facility: CLINIC | Age: 69
End: 2024-01-12

## 2024-01-12 NOTE — CARE COORDINATION
Initial Contact Social Work Note - Ambulatory  1/12/2024      Date of referral: 1/12/23  Referral received from: NP PCP  Reason for referral: additional support     Previous SW referral: No, but Home Health SW was previously involved 10/23  If yes, brief summary of outcome:     Two Identifiers Verified: Yes    Insurance Provider:  Medicare at this time.    Support System:  Adult Child/Children    [place] Status:  N/A cannot receive VA services    Community Providers:  None at this time.    ADL Assistance Needed: Transferring    Housing/Living Concerns or Home Modification Needs: Not at this time.     Transportation Concern: Not at this time    Medication Cost Concern: No not this time.     Medication Adherence Concern: Yes     Financial Concern(s): Not at time    Income (only if applicable): N/A    Ability to Read/Write: Yes    Advance Care Plan:  Not on file; educated patient    Other:     Identified Needs:  Help at home.  [facility]      Social Work Plan:  Referral for MOW  Follow-up on [facility] Referral  Discussing additional options  Next Steps: Follow-up with daughter next week    Method of Communication With Provider (if appropriate): Chart Routing       Goals Addressed                   This Visit's Progress     Supportive resources in place to maintain patient in the community (ie. Home Health, DME equipment, refer to, medication assistant plan, etc.)        Patient and daughter to follow-up with [facility] for homecare aid by 2/12/24.

## 2024-01-14 PROBLEM — R65.10 SIRS (SYSTEMIC INFLAMMATORY RESPONSE SYNDROME) (HCC): Status: RESOLVED | Noted: 2021-01-28 | Resolved: 2024-01-14

## 2024-01-15 ENCOUNTER — CARE COORDINATION (OUTPATIENT)
Dept: CARE COORDINATION | Facility: CLINIC | Age: 69
End: 2024-01-15

## 2024-01-15 ENCOUNTER — HOME HEALTH ADMISSION (OUTPATIENT)
Dept: HOME HEALTH SERVICES | Facility: HOME HEALTH | Age: 69
End: 2024-01-15

## 2024-01-17 ENCOUNTER — TELEPHONE (OUTPATIENT)
Dept: INTERNAL MEDICINE CLINIC | Facility: CLINIC | Age: 69
End: 2024-01-17

## 2024-01-17 ASSESSMENT — ENCOUNTER SYMPTOMS
CHEST TIGHTNESS: 0
SHORTNESS OF BREATH: 1
ROS SKIN COMMENTS: DRY, FLAKING ON FEET
SORE THROAT: 0
RHINORRHEA: 0

## 2024-01-17 NOTE — TELEPHONE ENCOUNTER
Doreen with [facility] called in stating that she needed Kayla to put in her notes in regard to this pt dating from the 1/8/24. She's needing it to cover what's needed for homehealth. Her contact information is in the contact area

## 2024-01-18 ENCOUNTER — CARE COORDINATION (OUTPATIENT)
Dept: CARE COORDINATION | Facility: CLINIC | Age: 69
End: 2024-01-18

## 2024-01-19 ENCOUNTER — CARE COORDINATION (OUTPATIENT)
Dept: CARE COORDINATION | Facility: CLINIC | Age: 69
End: 2024-01-19

## 2024-01-19 NOTE — CARE COORDINATION
Spoke with patient daughter who states that she has heard from [facility] and they are scheduled to meet with her on Monday.

## 2024-01-22 ENCOUNTER — HOME CARE VISIT (OUTPATIENT)
Dept: HOME HEALTH SERVICES | Facility: HOME HEALTH | Age: 69
End: 2024-01-22

## 2024-02-06 ENCOUNTER — OFFICE VISIT (OUTPATIENT)
Dept: VASCULAR SURGERY | Age: 69
End: 2024-02-06
Payer: MEDICARE

## 2024-02-06 VITALS
DIASTOLIC BLOOD PRESSURE: 94 MMHG | BODY MASS INDEX: 24.25 KG/M2 | OXYGEN SATURATION: 98 % | WEIGHT: 160 LBS | SYSTOLIC BLOOD PRESSURE: 171 MMHG | HEIGHT: 68 IN | HEART RATE: 62 BPM

## 2024-02-06 DIAGNOSIS — Z91.199 NONCOMPLIANCE WITH TREATMENT REGIMEN: ICD-10-CM

## 2024-02-06 DIAGNOSIS — L60.2 THICKENED NAILS: Primary | ICD-10-CM

## 2024-02-06 DIAGNOSIS — I71.40 ABDOMINAL AORTIC ANEURYSM (AAA) WITHOUT RUPTURE, UNSPECIFIED PART (HCC): ICD-10-CM

## 2024-02-06 DIAGNOSIS — Z72.0 TOBACCO ABUSE: ICD-10-CM

## 2024-02-06 DIAGNOSIS — G63 POLYNEUROPATHY ASSOCIATED WITH UNDERLYING DISEASE (HCC): ICD-10-CM

## 2024-02-06 DIAGNOSIS — I73.9 PAD (PERIPHERAL ARTERY DISEASE) (HCC): ICD-10-CM

## 2024-02-06 DIAGNOSIS — E11.40 TYPE 2 DIABETES MELLITUS WITH DIABETIC NEUROPATHY, WITHOUT LONG-TERM CURRENT USE OF INSULIN (HCC): ICD-10-CM

## 2024-02-06 PROCEDURE — 3051F HG A1C>EQUAL 7.0%<8.0%: CPT | Performed by: NURSE PRACTITIONER

## 2024-02-06 PROCEDURE — 3077F SYST BP >= 140 MM HG: CPT | Performed by: NURSE PRACTITIONER

## 2024-02-06 PROCEDURE — 3080F DIAST BP >= 90 MM HG: CPT | Performed by: NURSE PRACTITIONER

## 2024-02-06 PROCEDURE — 99204 OFFICE O/P NEW MOD 45 MIN: CPT | Performed by: NURSE PRACTITIONER

## 2024-02-06 PROCEDURE — 1123F ACP DISCUSS/DSCN MKR DOCD: CPT | Performed by: NURSE PRACTITIONER

## 2024-02-06 NOTE — PROGRESS NOTES
Distal Superficial Femoral Artery: Monophasic Doppler waveforms.   Proximal Popliteal Artery: Monophasic Doppler waveforms.   Distal Popliteal Artery: Monophasic Doppler waveforms.   Anterior Tibial Artery: Monophasic Doppler waveforms.   Posterior Tibial Artery: Monophasic Doppler waveforms.   Peroneal Artery: Absent Doppler waveforms.     Abdominal Aorta Measurements     PSV AP TR   Dist Ao 49.9 cm/s       3.55 cm       3.58 cm           Iliac Artery Measurements     Right PSV Left PSV   HARRISON Prox 147 cm/s       112 cm/s         EIA Prox 167 cm/s       250 cm/s           Right Lower Arterial Measurements     PSV Patrick Ratio   CFA Prox 152 cm/s          CFA Mid 423 cm/s          CFA Dist 180 cm/s          PFA Prox 294 cm/s          SFA Prox 143 cm/s       0.3         SFA Mid 186 cm/s       1.3         SFA Dist 218 cm/s       1.17         Pop Prox 37 cm/s       0.17         Pop Dist 93.5 cm/s             PTA Dist 0 cm/s          DOTTIE Dist 0 cm/s          Eduardo Dist 55.9 cm/s            Left Lower Arterial Measurements     PSV Patrick Ratio   CFA Prox 92.4 cm/s          CFA Mid 95.8 cm/s          CFA Dist 105 cm/s          PFA Prox 0 cm/s          SFA Prox 105 cm/s       1         SFA Mid 118 cm/s       1.12         SFA Dist 60.5 cm/s       0.51         Pop Prox 57.8 cm/s       0.96         Pop Dist 93.8 cm/s             PTA Dist 32.2 cm/s          DOTTIE Dist 9.2 cm/s          Eduardo Dist 0 cm/s            Arterial Pressure Measurements     Right Left   Brachial 180 mmHg       174 mmHg         Post Tibial 93 mmHg       92 mmHg         Dorslis Pedis 80 mmHg       0 mmHg         MALLORY 0.52 ratio       0.51 ratio         Toe 60 mmHg       63 mmHg         TBI 0.33 ratio       0.35 ratio             Assessment/Plan   Patient is a 68 year old male who we are performing an arterial duplex study on. His daughter accompanies him today and provides his information. He is non-compliant, only taking 4 of his medications. He is still smoking.

## 2024-04-14 DIAGNOSIS — E11.40 TYPE 2 DIABETES MELLITUS WITH DIABETIC NEUROPATHY, WITHOUT LONG-TERM CURRENT USE OF INSULIN (HCC): ICD-10-CM

## 2024-04-15 RX ORDER — GABAPENTIN 100 MG/1
CAPSULE ORAL
Qty: 180 CAPSULE | Refills: 0 | OUTPATIENT
Start: 2024-04-15

## 2024-04-23 DIAGNOSIS — E11.40 TYPE 2 DIABETES MELLITUS WITH DIABETIC NEUROPATHY, WITHOUT LONG-TERM CURRENT USE OF INSULIN (HCC): ICD-10-CM

## 2024-04-23 RX ORDER — GABAPENTIN 100 MG/1
CAPSULE ORAL
Qty: 180 CAPSULE | Refills: 0 | OUTPATIENT
Start: 2024-04-23

## 2024-05-07 ENCOUNTER — PATIENT MESSAGE (OUTPATIENT)
Dept: INTERNAL MEDICINE CLINIC | Facility: CLINIC | Age: 69
End: 2024-05-07

## 2024-05-07 DIAGNOSIS — E11.40 TYPE 2 DIABETES MELLITUS WITH DIABETIC NEUROPATHY, WITHOUT LONG-TERM CURRENT USE OF INSULIN (HCC): ICD-10-CM

## 2024-05-07 NOTE — TELEPHONE ENCOUNTER
From: Kim Cassidy  To: Kayla Sen  Sent: 5/7/2024 2:55 PM EDT  Subject: Podiatrist appt still needed    Kiara Sen,    We are still waiting for the podiatrist appointment.  The first one was scheduled with a facility that didn't take his insurance.     Please asap. Thank you,    Marisela Ange (daughter)

## 2024-05-09 RX ORDER — GABAPENTIN 100 MG/1
CAPSULE ORAL
Qty: 180 CAPSULE | Refills: 0 | Status: SHIPPED | OUTPATIENT
Start: 2024-05-09 | End: 2024-08-31

## 2024-06-02 DIAGNOSIS — E11.40 TYPE 2 DIABETES MELLITUS WITH DIABETIC NEUROPATHY, WITHOUT LONG-TERM CURRENT USE OF INSULIN (HCC): ICD-10-CM

## 2024-06-03 RX ORDER — GABAPENTIN 100 MG/1
CAPSULE ORAL
Qty: 180 CAPSULE | Refills: 0 | OUTPATIENT
Start: 2024-06-03

## 2024-07-31 DIAGNOSIS — E03.9 HYPOTHYROIDISM, UNSPECIFIED TYPE: ICD-10-CM

## 2024-07-31 RX ORDER — LEVOTHYROXINE SODIUM 200 UG/1
TABLET ORAL
Qty: 90 TABLET | Refills: 0 | OUTPATIENT
Start: 2024-07-31

## 2024-08-24 DIAGNOSIS — E11.40 TYPE 2 DIABETES MELLITUS WITH DIABETIC NEUROPATHY, WITHOUT LONG-TERM CURRENT USE OF INSULIN (HCC): ICD-10-CM

## 2024-08-26 RX ORDER — GABAPENTIN 100 MG/1
CAPSULE ORAL
Qty: 180 CAPSULE | Refills: 0 | OUTPATIENT
Start: 2024-08-26

## 2024-09-13 DIAGNOSIS — E11.40 TYPE 2 DIABETES MELLITUS WITH DIABETIC NEUROPATHY, WITHOUT LONG-TERM CURRENT USE OF INSULIN (HCC): ICD-10-CM

## 2024-09-16 RX ORDER — GABAPENTIN 100 MG/1
CAPSULE ORAL
Qty: 180 CAPSULE | Refills: 0 | OUTPATIENT
Start: 2024-09-16

## 2024-09-26 ENCOUNTER — OFFICE VISIT (OUTPATIENT)
Dept: INTERNAL MEDICINE CLINIC | Facility: CLINIC | Age: 69
End: 2024-09-26
Payer: MEDICARE

## 2024-09-26 VITALS — HEART RATE: 65 BPM | DIASTOLIC BLOOD PRESSURE: 64 MMHG | OXYGEN SATURATION: 95 % | SYSTOLIC BLOOD PRESSURE: 122 MMHG

## 2024-09-26 DIAGNOSIS — I25.10 CORONARY ARTERY DISEASE INVOLVING NATIVE CORONARY ARTERY OF NATIVE HEART WITHOUT ANGINA PECTORIS: ICD-10-CM

## 2024-09-26 DIAGNOSIS — E78.2 MIXED HYPERLIPIDEMIA: ICD-10-CM

## 2024-09-26 DIAGNOSIS — Z79.4 TYPE 2 DIABETES MELLITUS WITH DIABETIC NEUROPATHY, WITH LONG-TERM CURRENT USE OF INSULIN (HCC): Primary | ICD-10-CM

## 2024-09-26 DIAGNOSIS — I50.31 ACUTE HEART FAILURE WITH PRESERVED EJECTION FRACTION (HCC): ICD-10-CM

## 2024-09-26 DIAGNOSIS — E11.40 TYPE 2 DIABETES MELLITUS WITH DIABETIC NEUROPATHY, WITH LONG-TERM CURRENT USE OF INSULIN (HCC): Primary | ICD-10-CM

## 2024-09-26 DIAGNOSIS — D64.9 ANEMIA, UNSPECIFIED TYPE: ICD-10-CM

## 2024-09-26 DIAGNOSIS — E03.9 HYPOTHYROIDISM, UNSPECIFIED TYPE: ICD-10-CM

## 2024-09-26 DIAGNOSIS — E11.40 TYPE 2 DIABETES MELLITUS WITH DIABETIC NEUROPATHY, WITH LONG-TERM CURRENT USE OF INSULIN (HCC): ICD-10-CM

## 2024-09-26 DIAGNOSIS — F17.210 CIGARETTE SMOKER: ICD-10-CM

## 2024-09-26 DIAGNOSIS — Z79.4 TYPE 2 DIABETES MELLITUS WITH DIABETIC NEUROPATHY, WITH LONG-TERM CURRENT USE OF INSULIN (HCC): ICD-10-CM

## 2024-09-26 DIAGNOSIS — Z99.3 WHEELCHAIR DEPENDENCE: ICD-10-CM

## 2024-09-26 DIAGNOSIS — E78.00 PURE HYPERCHOLESTEROLEMIA: ICD-10-CM

## 2024-09-26 DIAGNOSIS — E11.40 TYPE 2 DIABETES MELLITUS WITH DIABETIC NEUROPATHY, WITHOUT LONG-TERM CURRENT USE OF INSULIN (HCC): ICD-10-CM

## 2024-09-26 DIAGNOSIS — N18.32 STAGE 3B CHRONIC KIDNEY DISEASE (HCC): ICD-10-CM

## 2024-09-26 LAB
ALBUMIN SERPL-MCNC: 3.1 G/DL (ref 3.2–4.6)
ALBUMIN/GLOB SERPL: 0.7 (ref 1–1.9)
ALP SERPL-CCNC: 126 U/L (ref 40–129)
ALT SERPL-CCNC: 12 U/L (ref 8–55)
ANION GAP SERPL CALC-SCNC: 9 MMOL/L (ref 9–18)
AST SERPL-CCNC: 18 U/L (ref 15–37)
BASOPHILS # BLD: 0.1 K/UL (ref 0–0.2)
BASOPHILS NFR BLD: 1 % (ref 0–2)
BILIRUB SERPL-MCNC: <0.2 MG/DL (ref 0–1.2)
BUN SERPL-MCNC: 36 MG/DL (ref 8–23)
CALCIUM SERPL-MCNC: 9.4 MG/DL (ref 8.8–10.2)
CHLORIDE SERPL-SCNC: 104 MMOL/L (ref 98–107)
CHOLEST SERPL-MCNC: 281 MG/DL (ref 0–200)
CO2 SERPL-SCNC: 25 MMOL/L (ref 20–28)
CREAT SERPL-MCNC: 1.52 MG/DL (ref 0.8–1.3)
DIFFERENTIAL METHOD BLD: ABNORMAL
EOSINOPHIL # BLD: 0.3 K/UL (ref 0–0.8)
EOSINOPHIL NFR BLD: 3 % (ref 0.5–7.8)
ERYTHROCYTE [DISTWIDTH] IN BLOOD BY AUTOMATED COUNT: 14.9 % (ref 11.9–14.6)
EST. AVERAGE GLUCOSE BLD GHB EST-MCNC: 183 MG/DL
GLOBULIN SER CALC-MCNC: 4.3 G/DL (ref 2.3–3.5)
GLUCOSE SERPL-MCNC: 76 MG/DL (ref 70–99)
HBA1C MFR BLD: 8 % (ref 0–5.6)
HCT VFR BLD AUTO: 48.5 % (ref 41.1–50.3)
HDLC SERPL-MCNC: 32 MG/DL (ref 40–60)
HDLC SERPL: 8.7 (ref 0–5)
HGB BLD-MCNC: 15.4 G/DL (ref 13.6–17.2)
IMM GRANULOCYTES # BLD AUTO: 0 K/UL (ref 0–0.5)
IMM GRANULOCYTES NFR BLD AUTO: 1 % (ref 0–5)
LDLC SERPL CALC-MCNC: 210 MG/DL (ref 0–100)
LYMPHOCYTES # BLD: 1.7 K/UL (ref 0.5–4.6)
LYMPHOCYTES NFR BLD: 21 % (ref 13–44)
MCH RBC QN AUTO: 27.4 PG (ref 26.1–32.9)
MCHC RBC AUTO-ENTMCNC: 31.8 G/DL (ref 31.4–35)
MCV RBC AUTO: 86.3 FL (ref 82–102)
MONOCYTES # BLD: 0.6 K/UL (ref 0.1–1.3)
MONOCYTES NFR BLD: 7 % (ref 4–12)
NEUTS SEG # BLD: 5.5 K/UL (ref 1.7–8.2)
NEUTS SEG NFR BLD: 67 % (ref 43–78)
NRBC # BLD: 0 K/UL (ref 0–0.2)
PLATELET # BLD AUTO: 291 K/UL (ref 150–450)
PMV BLD AUTO: 10.6 FL (ref 9.4–12.3)
POTASSIUM SERPL-SCNC: 4.2 MMOL/L (ref 3.5–5.1)
PROT SERPL-MCNC: 7.4 G/DL (ref 6.3–8.2)
RBC # BLD AUTO: 5.62 M/UL (ref 4.23–5.6)
SODIUM SERPL-SCNC: 137 MMOL/L (ref 136–145)
TRIGL SERPL-MCNC: 195 MG/DL (ref 0–150)
TSH, 3RD GENERATION: 25.4 UIU/ML (ref 0.27–4.2)
VLDLC SERPL CALC-MCNC: 39 MG/DL (ref 6–23)
WBC # BLD AUTO: 8.1 K/UL (ref 4.3–11.1)

## 2024-09-26 PROCEDURE — 3052F HG A1C>EQUAL 8.0%<EQUAL 9.0%: CPT | Performed by: NURSE PRACTITIONER

## 2024-09-26 PROCEDURE — 1123F ACP DISCUSS/DSCN MKR DOCD: CPT | Performed by: NURSE PRACTITIONER

## 2024-09-26 PROCEDURE — 3074F SYST BP LT 130 MM HG: CPT | Performed by: NURSE PRACTITIONER

## 2024-09-26 PROCEDURE — 3078F DIAST BP <80 MM HG: CPT | Performed by: NURSE PRACTITIONER

## 2024-09-26 PROCEDURE — 99215 OFFICE O/P EST HI 40 MIN: CPT | Performed by: NURSE PRACTITIONER

## 2024-09-26 RX ORDER — FUROSEMIDE 40 MG
40 TABLET ORAL DAILY PRN
Qty: 60 TABLET | Refills: 3 | Status: CANCELLED | OUTPATIENT
Start: 2024-09-26

## 2024-09-26 SDOH — ECONOMIC STABILITY: FOOD INSECURITY: WITHIN THE PAST 12 MONTHS, THE FOOD YOU BOUGHT JUST DIDN'T LAST AND YOU DIDN'T HAVE MONEY TO GET MORE.: NEVER TRUE

## 2024-09-26 SDOH — ECONOMIC STABILITY: INCOME INSECURITY: HOW HARD IS IT FOR YOU TO PAY FOR THE VERY BASICS LIKE FOOD, HOUSING, MEDICAL CARE, AND HEATING?: NOT VERY HARD

## 2024-09-26 SDOH — ECONOMIC STABILITY: FOOD INSECURITY: WITHIN THE PAST 12 MONTHS, YOU WORRIED THAT YOUR FOOD WOULD RUN OUT BEFORE YOU GOT MONEY TO BUY MORE.: NEVER TRUE

## 2024-09-26 ASSESSMENT — PATIENT HEALTH QUESTIONNAIRE - PHQ9
SUM OF ALL RESPONSES TO PHQ9 QUESTIONS 1 & 2: 6
SUM OF ALL RESPONSES TO PHQ QUESTIONS 1-9: 14
5. POOR APPETITE OR OVEREATING: MORE THAN HALF THE DAYS
SUM OF ALL RESPONSES TO PHQ QUESTIONS 1-9: 14
6. FEELING BAD ABOUT YOURSELF - OR THAT YOU ARE A FAILURE OR HAVE LET YOURSELF OR YOUR FAMILY DOWN: NOT AT ALL
1. LITTLE INTEREST OR PLEASURE IN DOING THINGS: NEARLY EVERY DAY
SUM OF ALL RESPONSES TO PHQ QUESTIONS 1-9: 14
9. THOUGHTS THAT YOU WOULD BE BETTER OFF DEAD, OR OF HURTING YOURSELF: NOT AT ALL
10. IF YOU CHECKED OFF ANY PROBLEMS, HOW DIFFICULT HAVE THESE PROBLEMS MADE IT FOR YOU TO DO YOUR WORK, TAKE CARE OF THINGS AT HOME, OR GET ALONG WITH OTHER PEOPLE: NOT DIFFICULT AT ALL
3. TROUBLE FALLING OR STAYING ASLEEP: NOT AT ALL
7. TROUBLE CONCENTRATING ON THINGS, SUCH AS READING THE NEWSPAPER OR WATCHING TELEVISION: NOT AT ALL
8. MOVING OR SPEAKING SO SLOWLY THAT OTHER PEOPLE COULD HAVE NOTICED. OR THE OPPOSITE, BEING SO FIGETY OR RESTLESS THAT YOU HAVE BEEN MOVING AROUND A LOT MORE THAN USUAL: NEARLY EVERY DAY
SUM OF ALL RESPONSES TO PHQ QUESTIONS 1-9: 14
2. FEELING DOWN, DEPRESSED OR HOPELESS: NEARLY EVERY DAY
4. FEELING TIRED OR HAVING LITTLE ENERGY: NEARLY EVERY DAY

## 2024-09-26 NOTE — PROGRESS NOTES
PROGRESS NOTE    SUBJECTIVE:   Kim Cassidy is a 69 y.o. male seen for a follow up visit for   Chief Complaint   Patient presents with    Annual Exam     Referral to podiatrist     Memory Loss     HPI      Per his daughter he needs:  He needs a replacement standard wheelchair.  The one he is in has loose wheels.  His daughter has provided Enedina Rocha's name and will try to get a call through to her regarding durable medical equipment provider.  At that time we will order the wheelchair.  He will also need a seat cushion.  Will also ask Enedina Rocha if he is eligible for wheelchair/transport assistance.      His daughter also requested a disabled placard.    Case Enedina Rocha [facility] for Medicaid [phone]  Needs transport assistance.    Lis freestyle 2,   Novolog 4 u TID ac  Tresiba 20 units at night    CHRONIC MEDICAL PROBLEMS  Peripheral neuropathy.  Unable to walk secondary to neuropathy.  Numbness tingling up to height of knees.  Also has pain in the same region.  He hasn't walked \"regular\" since 2023.  Reports no muscle tone in BLE.  He has been using an order wheelchair at home.  He lives in an older apartment and is not able to get through the bathroom door.  Daughter has purchased a bedside commode. In  /2023 he ran out of medicine.  Per daughter, \"he almost .  He stopped walking at that time.  Defecating and urinating on self at that time.      History myocardial infarction  around ?.  Reports stroke at same time as myocardial infarction.  He was not aware that he had MI or stroke.     Type II Diabetes mellitus   Diagnosed about 14 years ago.   Initially was on metformin but no improvement.  He is on insuin now.  He takes Tresiba 20 units prior to bed.  Novolog 4 units at meals 3 times per day.  He is wearing Freestyle Lis CGM.       History of obesity   He was overweight when he had MI and at that time found to have DM II, hypothyroid, stroke.       Tobacco abuse

## 2024-09-26 NOTE — PATIENT INSTRUCTIONS
[place] Transportation Resources*  (Call 211/[employer] if you need more resources.)    Medicaid Van: [employer]   They offer: Non-emergency medical transportation for Medicaid members and some Medicare Advantage plans  Contact: [phone]   Helpful Info: Must call for a ride at least 3 days before your appointment.  Call Monday- Friday 8:00am to 5:00pm. Transportation is available for MD appts, dialysis, x-rays, lab work, drug store, or other medical appointments.     [facility] on [facility]  What they offer: Provides assistance and medical transport to adults 60 years and older.  Contact: [phone]    [employer]   What they offer: Charge a fee for transport (not free).  Contact: [phone]    [employer]   They Offer: Charge a fee for transport (not free).  Contact: [phone]    Roundtrip  They Offer: non-emergency medical transportation (NEMT) that supports all levels of transport: rideshare (Lyft/Uber), Taxis, wheelchair vans when and where they are needed.  Contact: [email or web address]

## 2024-10-14 DIAGNOSIS — E11.40 TYPE 2 DIABETES MELLITUS WITH DIABETIC NEUROPATHY, WITH LONG-TERM CURRENT USE OF INSULIN (HCC): ICD-10-CM

## 2024-10-14 DIAGNOSIS — E11.40 TYPE 2 DIABETES MELLITUS WITH DIABETIC NEUROPATHY, WITHOUT LONG-TERM CURRENT USE OF INSULIN (HCC): ICD-10-CM

## 2024-10-14 DIAGNOSIS — Z79.4 TYPE 2 DIABETES MELLITUS WITH DIABETIC NEUROPATHY, WITH LONG-TERM CURRENT USE OF INSULIN (HCC): ICD-10-CM

## 2024-10-14 PROBLEM — Z91.199 NONCOMPLIANCE WITH TREATMENT REGIMEN: Chronic | Status: ACTIVE | Noted: 2023-07-24

## 2024-10-14 PROBLEM — Z99.3 WHEELCHAIR DEPENDENCE: Chronic | Status: ACTIVE | Noted: 2023-09-10

## 2024-10-14 PROBLEM — E78.5 HYPERLIPIDEMIA: Chronic | Status: ACTIVE | Noted: 2022-07-21

## 2024-10-14 PROBLEM — E78.00 PURE HYPERCHOLESTEROLEMIA: Chronic | Status: ACTIVE | Noted: 2022-10-20

## 2024-10-14 RX ORDER — GABAPENTIN 100 MG/1
CAPSULE ORAL
Qty: 210 CAPSULE | Refills: 5 | Status: SHIPPED | OUTPATIENT
Start: 2024-10-14 | End: 2025-01-18

## 2024-10-14 RX ORDER — GABAPENTIN 100 MG/1
CAPSULE ORAL
Qty: 210 CAPSULE | Refills: 1 | OUTPATIENT
Start: 2024-10-14 | End: 2025-02-05

## 2024-10-14 RX ORDER — LEVOTHYROXINE SODIUM 200 UG/1
200 TABLET ORAL DAILY
Qty: 90 TABLET | Refills: 1 | Status: SHIPPED | OUTPATIENT
Start: 2024-10-14

## 2024-10-14 RX ORDER — INSULIN DEGLUDEC 100 U/ML
20 INJECTION, SOLUTION SUBCUTANEOUS DAILY
Qty: 15 ML | Refills: 5 | Status: SHIPPED | OUTPATIENT
Start: 2024-10-14

## 2024-10-14 RX ORDER — INSULIN ASPART 100 [IU]/ML
4 INJECTION, SOLUTION INTRAVENOUS; SUBCUTANEOUS
Qty: 15 ML | Refills: 5 | Status: SHIPPED | OUTPATIENT
Start: 2024-10-14

## 2024-10-14 ASSESSMENT — ENCOUNTER SYMPTOMS: SHORTNESS OF BREATH: 0

## 2024-10-14 NOTE — TELEPHONE ENCOUNTER
Refill:  -Continuous Blood Gluc Sensor (FREESTYLE RICHY 2 SENSOR) MISC   6 each quantity w/ 5 refills    Send to:  [facility] [address] - P [phone] - F [phone]         Daughter called in regarding some medications. Daughter stated that the Gabapentin was never received at the pharmacy; also she was wanting to know why the Atorvastatin was discontinued??

## 2024-10-14 NOTE — TELEPHONE ENCOUNTER
Refill:  -Continuous Blood Gluc Sensor (FREESTYLE RICHY 2 SENSOR) MISC   6 each quantity w/ 5 refills    -Gabapentin 100mg     Send to:  [facility] [address] - P [phone] - F [phone]         Please adv   Daughter called in regarding some medications. Daughter stated that the Gabapentin was never received at the pharmacy; also she was wanting to know why the Atorvastatin was discontinued??

## 2024-12-11 RX ORDER — FUROSEMIDE 40 MG/1
40 TABLET ORAL DAILY PRN
Qty: 90 TABLET | Refills: 3 | Status: SHIPPED | OUTPATIENT
Start: 2024-12-11

## 2024-12-11 NOTE — TELEPHONE ENCOUNTER
Per medical record, lasix was continued at last appointment with Dr. Hicks on 10/23/23. No future appointment scheduled. Left message on voicemail for patient or daughter, Marisela, to call this triage nurse.  Requested Prescriptions     Pending Prescriptions Disp Refills    furosemide (LASIX) 40 MG tablet 90 tablet 3     Sig: Take 1 tablet by mouth daily as needed (Hypervolemia)

## 2024-12-11 NOTE — TELEPHONE ENCOUNTER
Advised daughter, Marisela, that patient will need to schedule FU appointment before Lasix may be refilled. Scheduled next available appointment with Dr. Hicks on 12/20/24 at 2:15 pm at [facility]. Marisela verbalized understanding. She states patient is in wheelchair, and will need transportation with Medicaid van. She asks for [employer] message to be sent with phone number for Medicaid van. [employer] message sent with Medicaid Van number.   Requested Prescriptions     Pending Prescriptions Disp Refills    furosemide (LASIX) 40 MG tablet 90 tablet 3     Sig: Take 1 tablet by mouth daily as needed (Hypervolemia)     Pended lasix refill, as above, sent to Dr. Hicks for approval.

## 2024-12-20 ENCOUNTER — OFFICE VISIT (OUTPATIENT)
Age: 69
End: 2024-12-20
Payer: MEDICARE

## 2024-12-20 VITALS
SYSTOLIC BLOOD PRESSURE: 130 MMHG | HEIGHT: 68 IN | DIASTOLIC BLOOD PRESSURE: 80 MMHG | WEIGHT: 150 LBS | HEART RATE: 62 BPM | BODY MASS INDEX: 22.73 KG/M2

## 2024-12-20 DIAGNOSIS — I25.10 CAD IN NATIVE ARTERY: Primary | ICD-10-CM

## 2024-12-20 DIAGNOSIS — Z79.4 TYPE 2 DIABETES MELLITUS WITH DIABETIC NEUROPATHY, WITH LONG-TERM CURRENT USE OF INSULIN (HCC): ICD-10-CM

## 2024-12-20 DIAGNOSIS — I73.9 PAD (PERIPHERAL ARTERY DISEASE) (HCC): ICD-10-CM

## 2024-12-20 DIAGNOSIS — E78.5 HYPERLIPIDEMIA, UNSPECIFIED HYPERLIPIDEMIA TYPE: ICD-10-CM

## 2024-12-20 DIAGNOSIS — I10 HYPERTENSION, UNSPECIFIED TYPE: ICD-10-CM

## 2024-12-20 DIAGNOSIS — I65.22 ASYMPTOMATIC STENOSIS OF LEFT CAROTID ARTERY: ICD-10-CM

## 2024-12-20 DIAGNOSIS — E11.40 TYPE 2 DIABETES MELLITUS WITH DIABETIC NEUROPATHY, WITH LONG-TERM CURRENT USE OF INSULIN (HCC): ICD-10-CM

## 2024-12-20 DIAGNOSIS — I71.40 ABDOMINAL AORTIC ANEURYSM (AAA) WITHOUT RUPTURE, UNSPECIFIED PART (HCC): ICD-10-CM

## 2024-12-20 DIAGNOSIS — I50.22 HEART FAILURE WITH MID-RANGE EJECTION FRACTION (HCC): ICD-10-CM

## 2024-12-20 PROCEDURE — 99214 OFFICE O/P EST MOD 30 MIN: CPT | Performed by: INTERNAL MEDICINE

## 2024-12-20 PROCEDURE — 1123F ACP DISCUSS/DSCN MKR DOCD: CPT | Performed by: INTERNAL MEDICINE

## 2024-12-20 PROCEDURE — 1125F AMNT PAIN NOTED PAIN PRSNT: CPT | Performed by: INTERNAL MEDICINE

## 2024-12-20 PROCEDURE — 3079F DIAST BP 80-89 MM HG: CPT | Performed by: INTERNAL MEDICINE

## 2024-12-20 PROCEDURE — 3075F SYST BP GE 130 - 139MM HG: CPT | Performed by: INTERNAL MEDICINE

## 2024-12-20 RX ORDER — ATORVASTATIN CALCIUM 20 MG/1
20 TABLET, FILM COATED ORAL DAILY
Qty: 30 TABLET | Refills: 3 | Status: SHIPPED | OUTPATIENT
Start: 2024-12-20

## 2024-12-20 NOTE — TELEPHONE ENCOUNTER
Patient's daughter requesting refill of Freestyle Lis sent to [facility] in [place] on [address]    LOV: 9/26/24  NOV: none, on wait list for new NP

## 2024-12-20 NOTE — PROGRESS NOTES
Reactions    Food Hives and Swelling     Onions and peppers    Other Hives     Peppers and onions         ROS:  No obvious pertinent positives on review of systems except for what was outlined above.       Objective:       /80   Pulse 62   Ht 1.727 m (5' 8\")   Wt 68 kg (150 lb)   BMI 22.81 kg/m²     BP Readings from Last 3 Encounters:   12/20/24 130/80   09/26/24 122/64   02/06/24 (!) 171/94       Wt Readings from Last 3 Encounters:   12/20/24 68 kg (150 lb)   02/06/24 72.6 kg (160 lb)   10/23/23 85.3 kg (188 lb)       General/Constitutional:   Alert and oriented x 3, no acute distress  HEENT:   normocephalic, atraumatic, moist mucous membranes  Neck:   No JVD or carotid bruits bilaterally  Cardiovascular:   regular rate and rhythm, no rub/gallop appreciated  Pulmonary:   clear to auscultation bilaterally, no respiratory distress  Abdomen:   soft, non-tender, non-distended  Ext:   No sig LE edema bilaterally  Skin:  warm and dry, no obvious rashes seen  Neuro:   no obvious sensory or motor deficits  Psychiatric:   normal mood and affect    Data Review:   Lab Results   Component Value Date    CHOL 281 (H) 09/26/2024    CHOL 269 (H) 01/08/2024    CHOL 150 10/20/2022     Lab Results   Component Value Date    TRIG 195 (H) 09/26/2024    TRIG 112 01/08/2024    TRIG 249 (H) 10/20/2022     Lab Results   Component Value Date    HDL 32 (L) 09/26/2024    HDL 41 01/08/2024    HDL 30 (L) 10/20/2022     No components found for: \"LDLCHOLESTEROL\", \"LDLCALC\"  Lab Results   Component Value Date    VLDL 39 (H) 09/26/2024    VLDL 22.4 01/08/2024    VLDL 49.8 (H) 10/20/2022     Lab Results   Component Value Date    CHOLHDLRATIO 8.7 (H) 09/26/2024    CHOLHDLRATIO 6.6 01/08/2024    CHOLHDLRATIO 5.0 10/20/2022        Lab Results   Component Value Date/Time     09/26/2024 04:02 PM     01/08/2024 02:24 PM     10/20/2022 03:16 PM    K 4.2 09/26/2024 04:02 PM    K 5.1 01/08/2024 02:24 PM    K 4.4 10/20/2022 03:16 PM

## 2025-04-17 DIAGNOSIS — I25.10 CAD IN NATIVE ARTERY: ICD-10-CM

## 2025-04-17 RX ORDER — ATORVASTATIN CALCIUM 20 MG/1
20 TABLET, FILM COATED ORAL DAILY
Qty: 90 TABLET | Refills: 3 | Status: SHIPPED | OUTPATIENT
Start: 2025-04-17

## 2025-04-17 NOTE — TELEPHONE ENCOUNTER
MEDICATION REFILL REQUEST      Name of Medication:  Atorvastatin  Dose:  20 mg  Frequency:  QD  Quantity:  90  Days' supply:  90 with 3 refills      Pharmacy Name/Location:  [facility]-[phone]

## 2025-04-17 NOTE — TELEPHONE ENCOUNTER
Per medical record, Lipitor was continued at last appointment with Dr. Hicks on 12/20/25. No future appointment scheduled at this time.  Requested Prescriptions     Pending Prescriptions Disp Refills    atorvastatin (LIPITOR) 20 MG tablet 90 tablet 3     Sig: Take 1 tablet by mouth daily     Pended Lipitor refill, as above, sent to Dr. Hicks for approval.   Standing/Walking

## 2025-04-25 ENCOUNTER — OFFICE VISIT (OUTPATIENT)
Dept: INTERNAL MEDICINE CLINIC | Facility: CLINIC | Age: 70
End: 2025-04-25
Payer: COMMERCIAL

## 2025-04-25 VITALS — DIASTOLIC BLOOD PRESSURE: 84 MMHG | OXYGEN SATURATION: 96 % | SYSTOLIC BLOOD PRESSURE: 130 MMHG | HEART RATE: 88 BPM

## 2025-04-25 DIAGNOSIS — Z12.5 PROSTATE CANCER SCREENING: ICD-10-CM

## 2025-04-25 DIAGNOSIS — G62.9 PERIPHERAL POLYNEUROPATHY: ICD-10-CM

## 2025-04-25 DIAGNOSIS — H91.93 HEARING DIFFICULTY OF BOTH EARS: ICD-10-CM

## 2025-04-25 DIAGNOSIS — Z74.09 IMPAIRED MOBILITY AND ADLS: ICD-10-CM

## 2025-04-25 DIAGNOSIS — I10 PRIMARY HYPERTENSION: ICD-10-CM

## 2025-04-25 DIAGNOSIS — L60.2 OVERGROWN TOENAILS: ICD-10-CM

## 2025-04-25 DIAGNOSIS — Z12.11 COLON CANCER SCREENING: ICD-10-CM

## 2025-04-25 DIAGNOSIS — Z79.4 TYPE 2 DIABETES MELLITUS WITH DIABETIC NEUROPATHY, WITH LONG-TERM CURRENT USE OF INSULIN (HCC): ICD-10-CM

## 2025-04-25 DIAGNOSIS — E11.40 TYPE 2 DIABETES MELLITUS WITH DIABETIC NEUROPATHY, WITH LONG-TERM CURRENT USE OF INSULIN (HCC): ICD-10-CM

## 2025-04-25 DIAGNOSIS — B35.1 ONYCHOMYCOSIS: ICD-10-CM

## 2025-04-25 DIAGNOSIS — R41.3 IMPAIRED MEMORY: ICD-10-CM

## 2025-04-25 DIAGNOSIS — Z78.9 IMPAIRED MOBILITY AND ADLS: ICD-10-CM

## 2025-04-25 DIAGNOSIS — E03.9 HYPOTHYROIDISM, UNSPECIFIED TYPE: ICD-10-CM

## 2025-04-25 DIAGNOSIS — I50.22 HEART FAILURE WITH MID-RANGE EJECTION FRACTION (HCC): ICD-10-CM

## 2025-04-25 DIAGNOSIS — Z00.00 MEDICARE ANNUAL WELLNESS VISIT, SUBSEQUENT: Primary | ICD-10-CM

## 2025-04-25 DIAGNOSIS — N18.32 STAGE 3B CHRONIC KIDNEY DISEASE (HCC): ICD-10-CM

## 2025-04-25 DIAGNOSIS — E78.2 MIXED HYPERLIPIDEMIA: Chronic | ICD-10-CM

## 2025-04-25 DIAGNOSIS — S81.812A LACERATION OF LEFT LOWER EXTREMITY, INITIAL ENCOUNTER: ICD-10-CM

## 2025-04-25 LAB
ALBUMIN SERPL-MCNC: 2.9 G/DL (ref 3.2–4.6)
ALBUMIN/GLOB SERPL: 0.7 (ref 1–1.9)
ALP SERPL-CCNC: 129 U/L (ref 40–129)
ALT SERPL-CCNC: 14 U/L (ref 8–55)
ANION GAP SERPL CALC-SCNC: 11 MMOL/L (ref 7–16)
AST SERPL-CCNC: 18 U/L (ref 15–37)
BASOPHILS # BLD: 0.05 K/UL (ref 0–0.2)
BASOPHILS NFR BLD: 0.6 % (ref 0–2)
BILIRUB SERPL-MCNC: 0.3 MG/DL (ref 0–1.2)
BUN SERPL-MCNC: 40 MG/DL (ref 8–23)
CALCIUM SERPL-MCNC: 9.3 MG/DL (ref 8.8–10.2)
CHLORIDE SERPL-SCNC: 104 MMOL/L (ref 98–107)
CHOLEST SERPL-MCNC: 236 MG/DL (ref 0–200)
CO2 SERPL-SCNC: 24 MMOL/L (ref 20–29)
CREAT SERPL-MCNC: 1.53 MG/DL (ref 0.8–1.3)
CREAT UR-MCNC: 61 MG/DL (ref 39–259)
DIFFERENTIAL METHOD BLD: ABNORMAL
EOSINOPHIL # BLD: 0.27 K/UL (ref 0–0.8)
EOSINOPHIL NFR BLD: 3 % (ref 0.5–7.8)
ERYTHROCYTE [DISTWIDTH] IN BLOOD BY AUTOMATED COUNT: 16.1 % (ref 11.9–14.6)
EST. AVERAGE GLUCOSE BLD GHB EST-MCNC: 194 MG/DL
GLOBULIN SER CALC-MCNC: 4 G/DL (ref 2.3–3.5)
GLUCOSE SERPL-MCNC: 158 MG/DL (ref 70–99)
HBA1C MFR BLD: 8.4 % (ref 0–5.6)
HCT VFR BLD AUTO: 47.7 % (ref 41.1–50.3)
HDLC SERPL-MCNC: 40 MG/DL (ref 40–60)
HDLC SERPL: 6 (ref 0–5)
HGB BLD-MCNC: 14.9 G/DL (ref 13.6–17.2)
IMM GRANULOCYTES # BLD AUTO: 0.05 K/UL (ref 0–0.5)
IMM GRANULOCYTES NFR BLD AUTO: 0.6 % (ref 0–5)
LDLC SERPL CALC-MCNC: 168 MG/DL (ref 0–100)
LYMPHOCYTES # BLD: 1.31 K/UL (ref 0.5–4.6)
LYMPHOCYTES NFR BLD: 14.6 % (ref 13–44)
MCH RBC QN AUTO: 27.1 PG (ref 26.1–32.9)
MCHC RBC AUTO-ENTMCNC: 31.2 G/DL (ref 31.4–35)
MCV RBC AUTO: 86.9 FL (ref 82–102)
MICROALBUMIN UR-MCNC: 12 MG/DL (ref 0–20)
MICROALBUMIN/CREAT UR-RTO: 197 MG/G (ref 0–30)
MONOCYTES # BLD: 0.66 K/UL (ref 0.1–1.3)
MONOCYTES NFR BLD: 7.4 % (ref 4–12)
NEUTS SEG # BLD: 6.61 K/UL (ref 1.7–8.2)
NEUTS SEG NFR BLD: 73.8 % (ref 43–78)
NRBC # BLD: 0 K/UL (ref 0–0.2)
PLATELET # BLD AUTO: 258 K/UL (ref 150–450)
PMV BLD AUTO: 11.6 FL (ref 9.4–12.3)
POTASSIUM SERPL-SCNC: 4.3 MMOL/L (ref 3.5–5.1)
PROT SERPL-MCNC: 6.9 G/DL (ref 6.3–8.2)
PSA SERPL-MCNC: 0.4 NG/ML (ref 0–4)
RBC # BLD AUTO: 5.49 M/UL (ref 4.23–5.6)
SODIUM SERPL-SCNC: 138 MMOL/L (ref 136–145)
T4 FREE SERPL-MCNC: 0.5 NG/DL (ref 0.9–1.7)
TRIGL SERPL-MCNC: 145 MG/DL (ref 0–150)
TSH W FREE THYROID IF ABNORMAL: 73.6 UIU/ML (ref 0.27–4.2)
VLDLC SERPL CALC-MCNC: 29 MG/DL (ref 6–23)
WBC # BLD AUTO: 9 K/UL (ref 4.3–11.1)

## 2025-04-25 PROCEDURE — 3075F SYST BP GE 130 - 139MM HG: CPT

## 2025-04-25 PROCEDURE — 1159F MED LIST DOCD IN RCRD: CPT

## 2025-04-25 PROCEDURE — 3079F DIAST BP 80-89 MM HG: CPT

## 2025-04-25 PROCEDURE — 1160F RVW MEDS BY RX/DR IN RCRD: CPT

## 2025-04-25 PROCEDURE — 99215 OFFICE O/P EST HI 40 MIN: CPT

## 2025-04-25 PROCEDURE — 1123F ACP DISCUSS/DSCN MKR DOCD: CPT

## 2025-04-25 PROCEDURE — 3052F HG A1C>EQUAL 8.0%<EQUAL 9.0%: CPT

## 2025-04-25 PROCEDURE — G0439 PPPS, SUBSEQ VISIT: HCPCS

## 2025-04-25 RX ORDER — MUPIROCIN 20 MG/G
OINTMENT TOPICAL
Qty: 30 G | Refills: 5 | Status: SHIPPED | OUTPATIENT
Start: 2025-04-25

## 2025-04-25 SDOH — ECONOMIC STABILITY: FOOD INSECURITY: WITHIN THE PAST 12 MONTHS, YOU WORRIED THAT YOUR FOOD WOULD RUN OUT BEFORE YOU GOT MONEY TO BUY MORE.: NEVER TRUE

## 2025-04-25 SDOH — ECONOMIC STABILITY: FOOD INSECURITY: WITHIN THE PAST 12 MONTHS, THE FOOD YOU BOUGHT JUST DIDN'T LAST AND YOU DIDN'T HAVE MONEY TO GET MORE.: NEVER TRUE

## 2025-04-25 ASSESSMENT — PATIENT HEALTH QUESTIONNAIRE - PHQ9
SUM OF ALL RESPONSES TO PHQ QUESTIONS 1-9: 0
SUM OF ALL RESPONSES TO PHQ QUESTIONS 1-9: 0
8. MOVING OR SPEAKING SO SLOWLY THAT OTHER PEOPLE COULD HAVE NOTICED. OR THE OPPOSITE, BEING SO FIGETY OR RESTLESS THAT YOU HAVE BEEN MOVING AROUND A LOT MORE THAN USUAL: NOT AT ALL
6. FEELING BAD ABOUT YOURSELF - OR THAT YOU ARE A FAILURE OR HAVE LET YOURSELF OR YOUR FAMILY DOWN: NOT AT ALL
4. FEELING TIRED OR HAVING LITTLE ENERGY: NOT AT ALL
SUM OF ALL RESPONSES TO PHQ QUESTIONS 1-9: 0
2. FEELING DOWN, DEPRESSED OR HOPELESS: NOT AT ALL
5. POOR APPETITE OR OVEREATING: NOT AT ALL
3. TROUBLE FALLING OR STAYING ASLEEP: NOT AT ALL
SUM OF ALL RESPONSES TO PHQ QUESTIONS 1-9: 0
9. THOUGHTS THAT YOU WOULD BE BETTER OFF DEAD, OR OF HURTING YOURSELF: NOT AT ALL
10. IF YOU CHECKED OFF ANY PROBLEMS, HOW DIFFICULT HAVE THESE PROBLEMS MADE IT FOR YOU TO DO YOUR WORK, TAKE CARE OF THINGS AT HOME, OR GET ALONG WITH OTHER PEOPLE: NOT DIFFICULT AT ALL
7. TROUBLE CONCENTRATING ON THINGS, SUCH AS READING THE NEWSPAPER OR WATCHING TELEVISION: NOT AT ALL
1. LITTLE INTEREST OR PLEASURE IN DOING THINGS: NOT AT ALL

## 2025-04-25 ASSESSMENT — LIFESTYLE VARIABLES
HOW MANY STANDARD DRINKS CONTAINING ALCOHOL DO YOU HAVE ON A TYPICAL DAY: PATIENT DOES NOT DRINK
HOW OFTEN DO YOU HAVE A DRINK CONTAINING ALCOHOL: NEVER

## 2025-04-25 NOTE — PATIENT INSTRUCTIONS
plaque.  After you've finished flossing, throw away the used floss and begin brushing:  Wet the brush and apply toothpaste.  Place the brush at a 45-degree angle where the teeth meet the gums. Press firmly, and move the brush in small circles over the surface of the teeth.  Be careful not to brush too hard. Vigorous brushing can make the gums pull away from the teeth and can scratch the tooth enamel.  Brush all surfaces of the teeth, on the tongue side and on the cheek side. Pay special attention to the front teeth and all surfaces of the back teeth.  Brush chewing surfaces with short back-and-forth strokes.  After you've finished, help the person rinse the remaining toothpaste from their mouth.  Where can you learn more?  Go to https://www.[employer].net/patientEd and enter F944 to learn more about \"Learning About Dental Care for Older Adults.\"  Current as of: July 31, 2024  Content Version: 14.4  © 2024-2025 [employer].   Care instructions adapted under license by [employer]. If you have questions about a medical condition or this instruction, always ask your healthcare professional. [employer], disclaims any warranty or liability for your use of this information.         Learning About Vision Tests  What are vision tests?     The four most common vision tests are visual acuity tests, refraction, visual field tests, and color vision tests.  Visual acuity (sharpness) tests  These tests are used:  To see if you need glasses or contact lenses.  To monitor an eye problem.  To check an eye injury.  Visual acuity tests are done as part of routine exams. You may also have this test when you get your 's license or apply for some types of jobs.  Visual field tests  These tests are used:  To check for vision loss in any area of your range of vision.  To screen for certain eye diseases.  To look for nerve damage after a stroke, head injury, or other problem that could reduce blood flow to the

## 2025-04-25 NOTE — PROGRESS NOTES
Kim Cassidy (:  1955) is a 70 y.o. male,Established patient, here for evaluation of the following chief complaint(s):  Medicare AWV and New Patient    Patient Active Problem List   Diagnosis    Hypothyroidism    Peripheral neuropathy    Type 2 diabetes mellitus with diabetic neuropathy, with long-term current use of insulin (HCC)    Cerebrovascular disease    Diabetes mellitus type II, uncontrolled    Tobacco abuse    Hypertension    Stage 3b chronic kidney disease (HCC)    Hyperlipidemia    Coronary artery disease involving native coronary artery of native heart without angina pectoris    Acute heart failure with preserved ejection fraction (HCC)    Cigarette smoker    Pure hypercholesterolemia    Anemia    Noncompliance with treatment regimen    Debility    Heart failure with mid-range ejection fraction (HCC)    Asymptomatic stenosis of left carotid artery    Chest pain of unknown etiology    Acute kidney injury    Closed fracture of multiple ribs of left side with routine healing    NSTEMI (non-ST elevated myocardial infarction) (HCC)    Polyneuropathy associated with underlying disease    Prolonged Q-T interval on ECG    Pulmonary nodules    Shortness of breath    Traumatic pneumothorax    Wheelchair dependence    Wheezing    Abdominal aortic aneurysm (AAA) without rupture    PAD (peripheral artery disease)     25:  Pt presents with daughter and daughter's boyfriend.  DM II- Doesn't have continuous glucometer and rarely check his own blood sugar. Occasionally takes insulin with meals. Does take NPH.  Blood sugars in the 300s when he does check it. Daughter is concerned about his nutritional intake and would like him to get ensure covered. She has paperwork for completion.   HLD- tolerating Lipitor. Does not eat well or exercise as he is confined to his chair.  Hypothyroidism- Taking levothyroxine with other medications. TSH is always high. Was unaware that it should be taken alone. 
Rx Refill Note  Requested Prescriptions     Pending Prescriptions Disp Refills    amLODIPine (NORVASC) 10 MG tablet 90 tablet 3     Sig: Take 1 tablet by mouth Daily.      Last office visit with prescribing clinician: 2/20/2024   Last telemedicine visit with prescribing clinician: Visit date not found   Next office visit with prescribing clinician: 8/20/2024                         Ginny Velazquez MA  05/17/24, 09:33 EDT    
(JARDIANCE) 10 MG tablet Take 1 tablet by mouth daily Yes Rolando Hicks MD   glucose monitoring kit 1 kit by Does not apply route daily [facility] sent fax requesting One Touch Delica Yes Kelly Sarmiento APRN - CNP   blood glucose monitor strips Test 4x times a day & as needed for symptoms of irregular blood glucose. Dispense sufficient amount for indicated testing frequency plus additional to accommodate PRN testing needs. Yes Kelly Sarmiento APRN - CNP   Insulin Pen Needle (PEN NEEDLES) 32G X 4 MM MISC Use twice a day and as needed Yes Kelly Sarmiento APRN - CNP   Lancets 28G MISC  Yes Provider, MD Luz   Continuous Glucose Sensor (FREESTYLE RICHY 2 SENSOR) MISC 1 each by Does not apply route every 14 days  Patient not taking: Reported on 4/25/2025  Kalyani Noel MD     CareTeam (Including outside providers/suppliers regularly involved in providing care):   Patient Care Team:  Mar Villanueva FNP as PCP - General (Internal Medicine)  Mar Villanueva FNP as PCP - Empaneled Provider  Juvenal Owusu MD as Surgeon     Recommendations for Preventive Services Due: see orders and patient instructions/AVS.  Recommended screening schedule for the next 5-10 years is provided to the patient in written form: see Patient Instructions/AVS.     Reviewed and updated this visit:  Tobacco  Allergies  Meds  Problems  Med Hx  Surg Hx  Fam Hx  Sexual   Hx

## 2025-04-28 ENCOUNTER — RESULTS FOLLOW-UP (OUTPATIENT)
Dept: INTERNAL MEDICINE CLINIC | Facility: CLINIC | Age: 70
End: 2025-04-28

## 2025-04-28 DIAGNOSIS — E03.9 HYPOTHYROIDISM, UNSPECIFIED TYPE: Primary | ICD-10-CM

## 2025-04-28 RX ORDER — ACYCLOVIR 400 MG/1
TABLET ORAL
Qty: 1 EACH | Refills: 0 | Status: SHIPPED | OUTPATIENT
Start: 2025-04-28

## 2025-04-28 RX ORDER — ACYCLOVIR 400 MG/1
TABLET ORAL
Qty: 9 EACH | Refills: 5 | Status: SHIPPED | OUTPATIENT
Start: 2025-04-28

## 2025-04-28 ASSESSMENT — ENCOUNTER SYMPTOMS
BACK PAIN: 1
COLOR CHANGE: 1

## 2025-05-01 ENCOUNTER — TELEMEDICINE (OUTPATIENT)
Dept: INTERNAL MEDICINE CLINIC | Facility: CLINIC | Age: 70
End: 2025-05-01
Payer: COMMERCIAL

## 2025-05-01 DIAGNOSIS — Z79.4 TYPE 2 DIABETES MELLITUS WITH DIABETIC NEUROPATHY, WITH LONG-TERM CURRENT USE OF INSULIN (HCC): Primary | ICD-10-CM

## 2025-05-01 DIAGNOSIS — E03.9 ACQUIRED HYPOTHYROIDISM: ICD-10-CM

## 2025-05-01 DIAGNOSIS — E11.40 TYPE 2 DIABETES MELLITUS WITH DIABETIC NEUROPATHY, WITH LONG-TERM CURRENT USE OF INSULIN (HCC): Primary | ICD-10-CM

## 2025-05-01 DIAGNOSIS — E78.2 MIXED HYPERLIPIDEMIA: Chronic | ICD-10-CM

## 2025-05-01 PROBLEM — R06.02 SHORTNESS OF BREATH: Status: RESOLVED | Noted: 2023-04-07 | Resolved: 2025-05-01

## 2025-05-01 PROBLEM — R06.2 WHEEZING: Status: RESOLVED | Noted: 2021-01-28 | Resolved: 2025-05-01

## 2025-05-01 PROBLEM — Z91.199 NONCOMPLIANCE WITH TREATMENT REGIMEN: Chronic | Status: RESOLVED | Noted: 2023-07-24 | Resolved: 2025-05-01

## 2025-05-01 PROBLEM — R07.9 CHEST PAIN OF UNKNOWN ETIOLOGY: Status: RESOLVED | Noted: 2023-09-06 | Resolved: 2025-05-01

## 2025-05-01 PROBLEM — E78.00 PURE HYPERCHOLESTEROLEMIA: Chronic | Status: RESOLVED | Noted: 2022-10-20 | Resolved: 2025-05-01

## 2025-05-01 PROBLEM — N17.9 ACUTE KIDNEY INJURY: Status: RESOLVED | Noted: 2021-01-28 | Resolved: 2025-05-01

## 2025-05-01 PROCEDURE — 99214 OFFICE O/P EST MOD 30 MIN: CPT

## 2025-05-01 RX ORDER — ATORVASTATIN CALCIUM 40 MG/1
40 TABLET, FILM COATED ORAL DAILY
Qty: 30 TABLET | Refills: 3 | Status: SHIPPED | OUTPATIENT
Start: 2025-05-01

## 2025-05-01 ASSESSMENT — ENCOUNTER SYMPTOMS
COUGH: 0
SHORTNESS OF BREATH: 0

## 2025-05-01 NOTE — ASSESSMENT & PLAN NOTE
Uncontrolled- start Mounjaro. Continue NPH. Will address insulin needs with new A1C at f/u.     Orders:    Tirzepatide (MOUNJARO) 2.5 MG/0.5ML SOAJ pen; Inject 2.5 mg into the skin every 7 days    Tirzepatide (MOUNJARO) 5 MG/0.5ML SOAJ pen; Inject 5 mg into the skin every 7 days    Tirzepatide (MOUNJARO) 7.5 MG/0.5ML SOAJ pen; Inject 7.5 mg into the skin every 7 days

## 2025-05-01 NOTE — PROGRESS NOTES
Kim Cassidy, was evaluated through a synchronous (real-time) audio-video encounter. The patient (or guardian if applicable) is aware that this is a billable service, which includes applicable co-pays. This Virtual Visit was conducted with patient's (and/or legal guardian's) consent. Patient identification was verified, and a caregiver was present when appropriate.   The patient was located at Home: [address]  [facility] [address]  Provider was located at Facility (Appt Dept): [address]  [place] [address]  Confirm you are appropriately licensed, registered, or certified to deliver care in the state where the patient is located as indicated above. If you are not or unsure, please re-schedule the visit: Yes, I confirm.     Kim Cassidy (:  1955) is a Established patient, presenting virtually for evaluation of the following:      Below is the assessment and plan developed based on review of pertinent history, physical exam, labs, studies, and medications.     Assessment & Plan  Type 2 diabetes mellitus with diabetic neuropathy, with long-term current use of insulin (HCC)   Uncontrolled- start Mounjaro. Continue NPH. Will address insulin needs with new A1C at f/u.     Orders:    Tirzepatide (MOUNJARO) 2.5 MG/0.5ML SOAJ pen; Inject 2.5 mg into the skin every 7 days    Tirzepatide (MOUNJARO) 5 MG/0.5ML SOAJ pen; Inject 5 mg into the skin every 7 days    Tirzepatide (MOUNJARO) 7.5 MG/0.5ML SOAJ pen; Inject 7.5 mg into the skin every 7 days    Acquired hypothyroidism   Discussed recent labs. Now taking medication alone. 6 week TSH recheck is scheduled- plan pending results.     Mixed hyperlipidemia   Agreeable to increasing Lipitor as current dose is subtherapeutic. Recheck lipid panel in 3 months.     Orders:    atorvastatin (LIPITOR) 40 MG tablet; Take 1 tablet by mouth daily      Return in about 3 months (around 2025) for Routine F/U- need a1c TSH?.

## 2025-05-01 NOTE — ASSESSMENT & PLAN NOTE
Discussed recent labs. Now taking medication alone. 6 week TSH recheck is scheduled- plan pending results.

## 2025-05-03 NOTE — ASSESSMENT & PLAN NOTE
Agreeable to increasing Lipitor as current dose is subtherapeutic. Recheck lipid panel in 3 months.     Orders:    atorvastatin (LIPITOR) 40 MG tablet; Take 1 tablet by mouth daily

## 2025-05-22 DIAGNOSIS — E11.40 TYPE 2 DIABETES MELLITUS WITH DIABETIC NEUROPATHY, WITH LONG-TERM CURRENT USE OF INSULIN (HCC): ICD-10-CM

## 2025-05-22 DIAGNOSIS — Z79.4 TYPE 2 DIABETES MELLITUS WITH DIABETIC NEUROPATHY, WITH LONG-TERM CURRENT USE OF INSULIN (HCC): ICD-10-CM

## 2025-05-22 RX ORDER — TIRZEPATIDE 2.5 MG/.5ML
INJECTION, SOLUTION SUBCUTANEOUS
Qty: 4 ML | Refills: 0 | OUTPATIENT
Start: 2025-05-22

## 2025-05-25 DIAGNOSIS — Z79.4 TYPE 2 DIABETES MELLITUS WITH DIABETIC NEUROPATHY, WITH LONG-TERM CURRENT USE OF INSULIN (HCC): ICD-10-CM

## 2025-05-25 DIAGNOSIS — E11.40 TYPE 2 DIABETES MELLITUS WITH DIABETIC NEUROPATHY, WITH LONG-TERM CURRENT USE OF INSULIN (HCC): ICD-10-CM

## 2025-05-27 RX ORDER — TIRZEPATIDE 2.5 MG/.5ML
INJECTION, SOLUTION SUBCUTANEOUS
Qty: 4 ML | Refills: 0 | OUTPATIENT
Start: 2025-05-27

## 2025-05-29 DIAGNOSIS — E11.40 TYPE 2 DIABETES MELLITUS WITH DIABETIC NEUROPATHY, WITH LONG-TERM CURRENT USE OF INSULIN (HCC): ICD-10-CM

## 2025-05-29 DIAGNOSIS — Z79.4 TYPE 2 DIABETES MELLITUS WITH DIABETIC NEUROPATHY, WITH LONG-TERM CURRENT USE OF INSULIN (HCC): ICD-10-CM

## 2025-05-29 NOTE — TELEPHONE ENCOUNTER
Patient is needing a refill on:     Tirzepatide (MOUNJARO) 7.5 MG/0.5ML SOAJ pen     [facility] [address] [place] [address] [address] - P [phone] - F [phone]     Also is asking if you are able to order patient blood pressure cuff and a pulse oximeter to keep track of his oxygen levels?    None has reached out to her about patients new wheelchair.     He received his glucose drinks, she wanted to let you know.       Please advise.

## 2025-06-19 ENCOUNTER — OFFICE VISIT (OUTPATIENT)
Dept: AUDIOLOGY | Age: 70
End: 2025-06-19
Payer: COMMERCIAL

## 2025-06-19 DIAGNOSIS — H90.3 SENSORINEURAL HEARING LOSS, BILATERAL: Primary | ICD-10-CM

## 2025-06-19 PROCEDURE — 92557 COMPREHENSIVE HEARING TEST: CPT | Performed by: AUDIOLOGIST

## 2025-06-19 PROCEDURE — 92567 TYMPANOMETRY: CPT | Performed by: AUDIOLOGIST

## 2025-06-19 NOTE — PROGRESS NOTES
AUDIOLOGY EVALUATION    Kim Cassidy had Tympanometry and Audiometry performed today.    The patient denies concerns for hearing, as well as all other otologic symptoms at this time.    Results as follows:    Tympanometry    Type As -  on right    Audiometry    Test Performed - Comprehensive Audiogram    Type of Loss - Right Ear: abnormal hearing: degree of loss is mild to severe sensorineural hearing loss                           Left Ear: abnormal hearing: degree of loss is mild to severe sensorineural hearing loss     SRT   Measurement Right Ear Left Ear   Value 40 50   Unit dB dB     Discrimination  Measurement Right Ear Left Ear   Value 80% 80%   Unit dB dB     Recommend  Binaural amplification following medical clearance due to presence of left-sided asymmetry.  Patient elects to forego ENT at this time.  Annual audios or sooner PRN    Mathew Abrams, CCC-A

## 2025-09-03 DIAGNOSIS — E11.40 TYPE 2 DIABETES MELLITUS WITH DIABETIC NEUROPATHY, WITH LONG-TERM CURRENT USE OF INSULIN (HCC): ICD-10-CM

## 2025-09-03 DIAGNOSIS — Z79.4 TYPE 2 DIABETES MELLITUS WITH DIABETIC NEUROPATHY, WITH LONG-TERM CURRENT USE OF INSULIN (HCC): ICD-10-CM

## 2025-09-04 RX ORDER — TIRZEPATIDE 7.5 MG/.5ML
INJECTION, SOLUTION SUBCUTANEOUS
Qty: 4 ML | Refills: 0 | Status: SHIPPED | OUTPATIENT
Start: 2025-09-04